# Patient Record
Sex: FEMALE | Race: WHITE | NOT HISPANIC OR LATINO | Employment: FULL TIME | URBAN - METROPOLITAN AREA
[De-identification: names, ages, dates, MRNs, and addresses within clinical notes are randomized per-mention and may not be internally consistent; named-entity substitution may affect disease eponyms.]

---

## 2023-09-11 PROBLEM — D22.4 MELANOCYTIC NEVI OF SCALP AND NECK: Status: ACTIVE | Noted: 2023-07-07

## 2023-09-11 PROBLEM — Z85.828 PERSONAL HISTORY OF OTHER MALIGNANT NEOPLASM OF SKIN: Status: ACTIVE | Noted: 2023-07-07

## 2023-09-11 PROBLEM — K43.9 EPIGASTRIC HERNIA: Status: ACTIVE | Noted: 2023-09-11

## 2023-09-11 PROBLEM — D22.60 MELANOCYTIC NEVI OF UNSPECIFIED UPPER LIMB, INCLUDING SHOULDER: Status: ACTIVE | Noted: 2023-07-07

## 2023-09-11 PROBLEM — L30.1 DYSHIDROSIS (POMPHOLYX): Status: ACTIVE | Noted: 2023-07-07

## 2023-09-11 PROBLEM — L81.4 LENTIGINES: Status: ACTIVE | Noted: 2023-07-07

## 2023-09-11 PROBLEM — D18.01 HEMANGIOMA OF SKIN AND SUBCUTANEOUS TISSUE: Status: ACTIVE | Noted: 2023-07-07

## 2023-09-11 PROBLEM — D36.7 BENIGN NEOPLASM OF TRUNK: Status: ACTIVE | Noted: 2023-07-07

## 2023-09-11 PROBLEM — D48.5 NEOPLASM OF UNCERTAIN BEHAVIOR OF SKIN: Status: ACTIVE | Noted: 2023-07-07

## 2023-09-11 PROBLEM — L57.9 SKIN CHANGES DUE TO CHRONIC EXPOSURE TO NONIONIZING RADIATION, UNSPECIFIED: Status: ACTIVE | Noted: 2023-07-07

## 2023-09-11 PROBLEM — D22.30 MELANOCYTIC NEVI OF UNSPECIFIED PART OF FACE: Status: ACTIVE | Noted: 2023-07-07

## 2023-09-11 PROBLEM — L60.1 ONYCHOLYSIS: Status: ACTIVE | Noted: 2023-07-07

## 2023-09-11 PROBLEM — D22.20 MELANOCYTIC NEVI OF EAR AND EXTERNAL AURICULAR CANAL: Status: ACTIVE | Noted: 2023-07-07

## 2023-09-11 PROBLEM — D36.7 BENIGN NEOPLASM OF UPPER EXTREMITY: Status: ACTIVE | Noted: 2023-07-07

## 2023-09-11 PROBLEM — B00.9 HERPES: Status: ACTIVE | Noted: 2023-09-11

## 2023-09-11 PROBLEM — H16.149 SPK (SUPERFICIAL PUNCTATE KERATITIS): Status: ACTIVE | Noted: 2023-09-11

## 2023-09-11 PROBLEM — H10.89 GPC (GIANT PAPILLARY CONJUNCTIVITIS): Status: ACTIVE | Noted: 2023-09-11

## 2023-09-11 PROBLEM — D22.5 MELANOCYTIC NEVI OF TRUNK: Status: ACTIVE | Noted: 2023-07-07

## 2023-09-11 PROBLEM — E55.9 VITAMIN D DEFICIENCY: Status: ACTIVE | Noted: 2023-09-11

## 2023-09-11 PROBLEM — F19.20 STEROID DEPENDENCE (MULTI): Status: ACTIVE | Noted: 2023-09-11

## 2023-09-11 PROBLEM — D22.71 MELANOCYTIC NEVI OF RIGHT LOWER LIMB, INCLUDING HIP: Status: ACTIVE | Noted: 2023-07-07

## 2023-09-11 PROBLEM — M81.0 OSTEOPOROSIS: Status: ACTIVE | Noted: 2023-09-11

## 2023-09-11 PROBLEM — K42.9 UMBILICAL HERNIA WITHOUT OBSTRUCTION AND WITHOUT GANGRENE: Status: ACTIVE | Noted: 2023-09-11

## 2023-09-11 PROBLEM — L81.4 OTHER MELANIN HYPERPIGMENTATION: Status: ACTIVE | Noted: 2023-07-07

## 2023-09-11 PROBLEM — H52.4 HYPEROPIA WITH PRESBYOPIA: Status: ACTIVE | Noted: 2023-09-11

## 2023-09-11 PROBLEM — D49.2 NEOPLASM OF UNSPECIFIED BEHAVIOR OF BONE, SOFT TISSUE, AND SKIN: Status: ACTIVE | Noted: 2023-07-07

## 2023-09-11 PROBLEM — L82.1 OTHER SEBORRHEIC KERATOSIS: Status: ACTIVE | Noted: 2023-07-07

## 2023-09-11 PROBLEM — S05.91XA OCULAR TRAUMA OF RIGHT EYE: Status: ACTIVE | Noted: 2023-09-11

## 2023-09-11 PROBLEM — D36.7 BENIGN NEOPLASM OF LOWER EXTREMITY: Status: ACTIVE | Noted: 2023-07-07

## 2023-09-11 PROBLEM — K51.90 ULCERATIVE COLITIS (MULTI): Status: ACTIVE | Noted: 2023-09-11

## 2023-09-11 PROBLEM — L90.5 SCAR CONDITION AND FIBROSIS OF SKIN: Status: ACTIVE | Noted: 2023-07-07

## 2023-09-11 PROBLEM — R92.30 DENSE BREAST TISSUE: Status: ACTIVE | Noted: 2023-09-11

## 2023-09-11 PROBLEM — H52.00 HYPEROPIA WITH PRESBYOPIA: Status: ACTIVE | Noted: 2023-09-11

## 2023-09-11 PROBLEM — L57.0 ACTINIC KERATOSIS: Status: ACTIVE | Noted: 2023-07-07

## 2023-09-11 PROBLEM — R05.3 CHRONIC COUGH: Status: ACTIVE | Noted: 2023-09-11

## 2023-09-11 RX ORDER — FLUTICASONE PROPIONATE 0.5 MG/ML
LOTION TOPICAL
COMMUNITY
Start: 2020-05-05 | End: 2023-10-13

## 2023-09-11 RX ORDER — RISEDRONATE SODIUM 35 MG/1
35 TABLET, FILM COATED ORAL
COMMUNITY
End: 2024-01-02

## 2023-09-11 RX ORDER — ESCITALOPRAM OXALATE 10 MG/1
1 TABLET ORAL DAILY
COMMUNITY
Start: 2021-07-08

## 2023-09-11 RX ORDER — PREDNISONE 1 MG/1
3 TABLET ORAL DAILY
COMMUNITY
Start: 2021-08-26 | End: 2023-12-05 | Stop reason: SDUPTHER

## 2023-09-11 RX ORDER — TRETINOIN 1 MG/G
CREAM TOPICAL
COMMUNITY
Start: 2020-09-15 | End: 2023-10-13

## 2023-09-11 RX ORDER — EFINACONAZOLE 100 MG/ML
SOLUTION TOPICAL
COMMUNITY
Start: 2020-11-18 | End: 2023-10-13

## 2023-09-11 RX ORDER — ERGOCALCIFEROL 1.25 MG/1
1.25 CAPSULE ORAL
COMMUNITY
End: 2023-12-12 | Stop reason: SDUPTHER

## 2023-09-11 RX ORDER — FOLIC ACID 0.8 MG
1 TABLET ORAL DAILY
COMMUNITY

## 2023-09-11 RX ORDER — SULFASALAZINE 500 MG/1
6 TABLET ORAL DAILY
COMMUNITY
Start: 2013-12-24 | End: 2024-04-03 | Stop reason: WASHOUT

## 2023-09-11 RX ORDER — TRETINOIN 0.25 MG/G
CREAM TOPICAL
COMMUNITY
Start: 2021-03-23 | End: 2023-10-13

## 2023-09-11 RX ORDER — ASPIRIN 325 MG
1 TABLET, DELAYED RELEASE (ENTERIC COATED) ORAL
COMMUNITY
End: 2023-10-13

## 2023-09-11 RX ORDER — TOFACITINIB 22 MG/1
1 TABLET, FILM COATED, EXTENDED RELEASE ORAL DAILY
COMMUNITY
Start: 2022-03-17 | End: 2023-10-19 | Stop reason: DRUGHIGH

## 2023-09-11 RX ORDER — PREDNISONE 1 MG/1
2 TABLET ORAL DAILY
COMMUNITY
End: 2024-03-28 | Stop reason: SDUPTHER

## 2023-10-13 ENCOUNTER — OFFICE VISIT (OUTPATIENT)
Dept: OBSTETRICS AND GYNECOLOGY | Facility: CLINIC | Age: 53
End: 2023-10-13
Payer: COMMERCIAL

## 2023-10-13 VITALS
HEART RATE: 86 BPM | WEIGHT: 133 LBS | SYSTOLIC BLOOD PRESSURE: 106 MMHG | DIASTOLIC BLOOD PRESSURE: 69 MMHG | HEIGHT: 68 IN | BODY MASS INDEX: 20.16 KG/M2

## 2023-10-13 DIAGNOSIS — Z12.31 ENCOUNTER FOR SCREENING MAMMOGRAM FOR MALIGNANT NEOPLASM OF BREAST: ICD-10-CM

## 2023-10-13 DIAGNOSIS — N95.1 VAGINAL DRYNESS, MENOPAUSAL: Primary | ICD-10-CM

## 2023-10-13 DIAGNOSIS — Z01.419 ENCOUNTER FOR GYNECOLOGICAL EXAMINATION WITHOUT ABNORMAL FINDING: ICD-10-CM

## 2023-10-13 PROCEDURE — 1036F TOBACCO NON-USER: CPT | Performed by: OBSTETRICS & GYNECOLOGY

## 2023-10-13 PROCEDURE — 99396 PREV VISIT EST AGE 40-64: CPT | Performed by: OBSTETRICS & GYNECOLOGY

## 2023-10-13 RX ORDER — ESTRADIOL 0.1 MG/G
0.5 CREAM VAGINAL NIGHTLY
Qty: 42.5 G | Refills: 2 | Status: SHIPPED | OUTPATIENT
Start: 2023-10-13 | End: 2023-12-05

## 2023-10-13 NOTE — PROGRESS NOTES
Subjective   Breanne Orta is a 53 y.o. female here for a routine exam. Current complaints: She has colitis and is current on her colonoscopy.  We discussed her twins are sophomore is in college.  S  he has begun to notice painful intercourse and vaginal dryness. Personal health questionnaire reviewed: yes.     Gynecologic History  Patient's last menstrual period was 05/01/2022.  Contraception: post menopausal status  Last Pap: 7/13/21. Results were: normal  Last mammogram: 8/25/23. Results were: normal    Obstetric History  OB History   No obstetric history on file.       Objective   Constitutional: Alert and in no acute distress. Well developed, well nourished.   Head and Face: Head and face: Normal.    Eyes: Normal external exam - nonicteric sclera, extraocular movements intact (EOMI) and no ptosis.   Neck: No neck asymmetry. Supple. Thyroid not enlarged and there were no palpable thyroid nodules.    Pulmonary: No respiratory distress.   Chest: Breasts: Normal appearance, no nipple discharge and no skin changes. Palpation of breasts and axillae: No palpable mass and no axillary lymphadenopathy.   Abdomen: Soft nontender; no abdominal mass palpated. No organomegaly. No hernias.   Genitourinary: External genitalia: Normal. No inguinal lymphadenopathy. Bartholin's Urethral and Skenes Glands: Normal. Urethra: Normal.  Bladder: Normal on palpation. Vagina: Normal. Cervix: Normal. No pap sent. Uterus: Normal.  Right Adnexa/parametria: Normal.  Left Adnexa/parametria: Normal.  Inspection of Perianal Area: Normal.   Musculoskeletal: No joint swelling seen, normal movements of all extremities.   Skin: Normal skin color and pigmentation, normal skin turgor, and no rash.   Neurologic: Non-focal. Grossly intact.   Psychiatric: Alert and oriented x 3. Affect normal to patient baseline. Mood: Appropriate.  Physical Exam     Assessment/Plan   Healthy female exam.  This is a 53-year-old female with a normal exam.  No Pap smear  was sent, she is high risk HPV negative.  Her routine mammogram was completed in August 2023 and ordered for 2024.  We discussed the vaginal dryness and pain with intercourse consistent with menopausal changes.  I recommend beginning estradiol cream.  She will use a pea-sized amount at the vaginal opening every night for 2 weeks, then 3 times a week thereafter.  It could take 8 to 12 weeks or longer for the full effect.  I will see her routinely in 1 year.  Mammogram ordered.

## 2023-10-19 DIAGNOSIS — K51.80 OTHER ULCERATIVE COLITIS WITHOUT COMPLICATION (MULTI): Primary | ICD-10-CM

## 2023-10-19 RX ORDER — TOFACITINIB 22 MG/1
22 TABLET, FILM COATED, EXTENDED RELEASE ORAL DAILY
Qty: 90 TABLET | Refills: 2 | Status: SHIPPED | OUTPATIENT
Start: 2023-10-19 | End: 2023-11-01 | Stop reason: SDUPTHER

## 2023-10-31 ENCOUNTER — OFFICE VISIT (OUTPATIENT)
Dept: OPHTHALMOLOGY | Facility: CLINIC | Age: 53
End: 2023-10-31
Payer: COMMERCIAL

## 2023-10-31 DIAGNOSIS — S05.91XD RIGHT EYE INJURY, SUBSEQUENT ENCOUNTER: Primary | ICD-10-CM

## 2023-10-31 PROCEDURE — 99212 OFFICE O/P EST SF 10 MIN: CPT | Performed by: OPTOMETRIST

## 2023-10-31 ASSESSMENT — TONOMETRY
IOP_METHOD: GOLDMANN APPLANATION
OS_IOP_MMHG: CL'S
OD_IOP_MMHG: CL'S

## 2023-10-31 ASSESSMENT — REFRACTION_CURRENTRX
OS_SPHERE: +5.50
OD_BRAND: DAILIES AQUACOMFORT PLUS
OD_DIAMETER: 14.0
OS_DIAMETER: 14.0
OS_BASECURVE: 8.7
OD_SPHERE: +5.50
OS_BRAND: DAILIES AQUACOMFORT PLUS
OD_BASECURVE: 8.7

## 2023-10-31 ASSESSMENT — CONF VISUAL FIELD
OS_SUPERIOR_TEMPORAL_RESTRICTION: 0
OS_NORMAL: 1
OS_INFERIOR_NASAL_RESTRICTION: 0
OS_SUPERIOR_NASAL_RESTRICTION: 0
OD_SUPERIOR_NASAL_RESTRICTION: 0
OD_INFERIOR_TEMPORAL_RESTRICTION: 0
OD_INFERIOR_NASAL_RESTRICTION: 0
OD_SUPERIOR_TEMPORAL_RESTRICTION: 0
OD_NORMAL: 1
OS_INFERIOR_TEMPORAL_RESTRICTION: 0

## 2023-10-31 ASSESSMENT — SLIT LAMP EXAM - LIDS
COMMENTS: NORMAL
COMMENTS: NORMAL

## 2023-10-31 ASSESSMENT — VISUAL ACUITY
VA_OR_OD_CURRENT_RX: 20/20
VA_OR_OS_CURRENT_RX: 20/20
CORRECTION_TYPE: CONTACTS
METHOD: SNELLEN - LINEAR
OS_CC: 20/20
OD_CC: 20/20

## 2023-10-31 ASSESSMENT — ENCOUNTER SYMPTOMS: EYES NEGATIVE: 1

## 2023-10-31 NOTE — PROGRESS NOTES
Assessment/Plan   Diagnoses and all orders for this visit:  Right eye injury, subsequent encounter    Patient has fully recovered from ocular injury and refractive state is returned to habitual    Wearing habitual contact lens (CL) power again    Resume annual exams, follow up 6 months full exam

## 2023-11-01 DIAGNOSIS — K51.80 OTHER ULCERATIVE COLITIS WITHOUT COMPLICATION (MULTI): ICD-10-CM

## 2023-11-01 RX ORDER — TOFACITINIB 22 MG/1
22 TABLET, FILM COATED, EXTENDED RELEASE ORAL DAILY
Qty: 90 TABLET | Refills: 2 | Status: SHIPPED | OUTPATIENT
Start: 2023-11-01 | End: 2023-11-02 | Stop reason: SDUPTHER

## 2023-11-02 DIAGNOSIS — K51.80 OTHER ULCERATIVE COLITIS WITHOUT COMPLICATION (MULTI): ICD-10-CM

## 2023-11-02 RX ORDER — TOFACITINIB 22 MG/1
TABLET, FILM COATED, EXTENDED RELEASE ORAL
Qty: 30 TABLET | Refills: 5 | Status: SHIPPED | OUTPATIENT
Start: 2023-11-02 | End: 2024-02-01 | Stop reason: SDUPTHER

## 2023-12-01 ENCOUNTER — TELEPHONE (OUTPATIENT)
Dept: DERMATOLOGY | Facility: CLINIC | Age: 53
End: 2023-12-01

## 2023-12-01 NOTE — TELEPHONE ENCOUNTER
Pt left message requesting an appointment for Botox with Dr. Augustine.  This nurse will forward request to PAR team to schedule.

## 2023-12-04 ASSESSMENT — DERMATOLOGY QUALITY OF LIFE (QOL) ASSESSMENT
RATE HOW BOTHERED YOU ARE BY EFFECTS OF YOUR SKIN PROBLEMS ON YOUR ACTIVITIES (EG, GOING OUT, ACCOMPLISHING WHAT YOU WANT, WORK ACTIVITIES OR YOUR RELATIONSHIPS WITH OTHERS): 0 - NEVER BOTHERED
RATE HOW EMOTIONALLY BOTHERED YOU ARE BY YOUR SKIN PROBLEM (FOR EXAMPLE, WORRY, EMBARRASSMENT, FRUSTRATION): 0 - NEVER BOTHERED
WHAT SINGLE SKIN CONDITION LISTED BELOW IS THE PATIENT ANSWERING THE QUALITY-OF-LIFE ASSESSMENT QUESTIONS ABOUT: NONE OF THE ABOVE
RATE HOW BOTHERED YOU ARE BY EFFECTS OF YOUR SKIN PROBLEMS ON YOUR ACTIVITIES (EG, GOING OUT, ACCOMPLISHING WHAT YOU WANT, WORK ACTIVITIES OR YOUR RELATIONSHIPS WITH OTHERS): 0 - NEVER BOTHERED
RATE HOW EMOTIONALLY BOTHERED YOU ARE BY YOUR SKIN PROBLEM (FOR EXAMPLE, WORRY, EMBARRASSMENT, FRUSTRATION): 0 - NEVER BOTHERED
WHAT SINGLE SKIN CONDITION LISTED BELOW IS THE PATIENT ANSWERING THE QUALITY-OF-LIFE ASSESSMENT QUESTIONS ABOUT: NONE OF THE ABOVE
RATE HOW BOTHERED YOU ARE BY SYMPTOMS OF YOUR SKIN PROBLEM (EG, ITCHING, STINGING BURNING, HURTING OR SKIN IRRITATION): 0 - NEVER BOTHERED
RATE HOW BOTHERED YOU ARE BY SYMPTOMS OF YOUR SKIN PROBLEM (EG, ITCHING, STINGING BURNING, HURTING OR SKIN IRRITATION): 0 - NEVER BOTHERED

## 2023-12-04 ASSESSMENT — PATIENT GLOBAL ASSESSMENT (PGA): WHAT IS THE PGA: PATIENT GLOBAL ASSESSMENT:  1 - CLEAR

## 2023-12-05 ENCOUNTER — OFFICE VISIT (OUTPATIENT)
Dept: DERMATOLOGY | Facility: CLINIC | Age: 53
End: 2023-12-05
Payer: COMMERCIAL

## 2023-12-05 DIAGNOSIS — L57.0 ACTINIC KERATOSIS: Primary | ICD-10-CM

## 2023-12-05 DIAGNOSIS — L81.4 LENTIGO: ICD-10-CM

## 2023-12-05 DIAGNOSIS — L90.5 SCAR CONDITIONS AND FIBROSIS OF SKIN: ICD-10-CM

## 2023-12-05 DIAGNOSIS — D18.01 HEMANGIOMA OF SKIN: ICD-10-CM

## 2023-12-05 DIAGNOSIS — L03.011 PARONYCHIA OF FINGER OF RIGHT HAND: ICD-10-CM

## 2023-12-05 DIAGNOSIS — Z85.828 PERSONAL HISTORY OF OTHER MALIGNANT NEOPLASM OF SKIN: ICD-10-CM

## 2023-12-05 DIAGNOSIS — D23.9 OTHER BENIGN NEOPLASM OF SKIN, UNSPECIFIED: ICD-10-CM

## 2023-12-05 DIAGNOSIS — Z12.83 ENCOUNTER FOR SCREENING FOR MALIGNANT NEOPLASM OF SKIN: ICD-10-CM

## 2023-12-05 PROCEDURE — 17000 DESTRUCT PREMALG LESION: CPT | Performed by: STUDENT IN AN ORGANIZED HEALTH CARE EDUCATION/TRAINING PROGRAM

## 2023-12-05 PROCEDURE — 1036F TOBACCO NON-USER: CPT | Performed by: DERMATOLOGY

## 2023-12-05 PROCEDURE — 17003 DESTRUCT PREMALG LES 2-14: CPT | Performed by: STUDENT IN AN ORGANIZED HEALTH CARE EDUCATION/TRAINING PROGRAM

## 2023-12-05 PROCEDURE — 99214 OFFICE O/P EST MOD 30 MIN: CPT | Performed by: DERMATOLOGY

## 2023-12-05 RX ORDER — CLOBETASOL PROPIONATE 0.5 MG/G
OINTMENT TOPICAL 2 TIMES DAILY
Qty: 60 G | Refills: 0 | Status: SHIPPED | OUTPATIENT
Start: 2023-12-05 | End: 2023-12-19

## 2023-12-05 NOTE — PROGRESS NOTES
Subjective     Breanne Orta is a 53 y.o. female who presents for the following: Skin Check.     Patient reports a flaky spot on her scalp that is near her Mohs surgery site and appeared about 2 months ago.    Review of Systems:  No other skin or systemic complaints other than what is documented elsewhere in the note.    The following portions of the chart were reviewed this encounter and updated as appropriate:  Meds         Skin Cancer History  - 2008- Hx of BCC left temple s/p Mohs  - 2005- Hx of moderately dysplastic nevus right lower back s/p excision     Family History:  Melanoma: Mother had MIS. and mom's brother (uncle)  of melanoma at age 35   Specialty Problems          Dermatology Problems    Actinic keratosis    Dyshidrosis (pompholyx)    Hemangioma of skin and subcutaneous tissue    Lentigines    Melanocytic nevi of ear and external auricular canal    Melanocytic nevi of right lower limb, including hip    Melanocytic nevi of scalp and neck    Melanocytic nevi of trunk    Melanocytic nevi of unspecified part of face    Melanocytic nevi of unspecified upper limb, including shoulder    Neoplasm of uncertain behavior of skin    Onycholysis    Other melanin hyperpigmentation    Other seborrheic keratosis    Personal history of other malignant neoplasm of skin    Scar condition and fibrosis of skin    Skin changes due to chronic exposure to nonionizing radiation, unspecified        Objective   Well appearing patient in no apparent distress; mood and affect are within normal limits.    A full examination was performed including scalp, head, eyes, ears, nose, lips, neck, chest, axillae, abdomen, back, buttocks, bilateral upper extremities, bilateral lower extremities, hands, feet, fingers, toes, fingernails, and toenails. All findings within normal limits unless otherwise noted below.    Assessment/Plan   1. Actinic keratosis (2)  Left Temple, Mid Forehead  Erythematous macules with gritty  scale.    Destr of lesion - Left Temple, Mid Forehead  Complexity: simple    Destruction method: cryotherapy    Informed consent: discussed and consent obtained    Lesion destroyed using liquid nitrogen: Yes    Cryotherapy cycles:  1  Outcome: patient tolerated procedure well with no complications    Post-procedure details: wound care instructions given      2. Other benign neoplasm of skin, unspecified  left medial upper back  3x3 mm darkly pigmented macule with possible pseudohorn cysts under dermoscopy                  - Favor benign seborrheic keratosis but possible melanocytic lesion  - Measured and photographed today, monitor at next visit  - Advised patient to monitor monthly and return for sooner appointment for biopsy if changing    3. Paronychia of finger of right hand  Right Distal Thumb  Shortened nail with mild erythema of proximal and lateral nail folds    - Patient reports stunted nail growth  - Favor paronychial dermatitis   - Recommend clobetasol 0.05% ointment daily and tretinoin cream in evening    Related Medications  clobetasol (Temovate) 0.05 % ointment  Apply topically 2 times a day for 14 days.    4. Scar conditions and fibrosis of skin (2)  Chest - Medial (Center), Left temple  Well-healed scar of left temple, slightly hypertrophic scar of mid chest    - s/p Mohs surgery for BCC of left temple in 2008  - Scar without evidence of recurrence  - Slightly hypertrophic scar of mid chest, previously treated with ILK but has improved since treatment    5. Hemangioma of skin  Scattered cherry-red papule(s).    Benign, reassurance provided    6. Lentigo  Scattered tan macules in sun-exposed areas.    Benign, reassurance provided    7. Encounter for screening for malignant neoplasm of skin    Related Procedures  Follow Up In Dermatology - Established Patient    8. Personal history of other malignant neoplasm of skin      RTC FBSE 6 months or sooner if left back lesion is changing.    Bushra Petersen,  MD PATEL personally reviewed the history, examined the patient, interpreted findings and tests, and provided the medical-decision making, and was present for the key portions of the visit and procedures.

## 2023-12-05 NOTE — PATIENT INSTRUCTIONS
It was great to see you in Dr. Rosado's clinic today. We treated a couple pre-cancers, called actinic keratoses, on the forehead with liquid nitrogen today. There is also a dark spot on your left back that we think may be benign but measured and photographed today to monitor at your next visit. It is currently only 3x3 mm. You can look at this spot once a month and if it is getting bigger, please call for a sooner appointment. Otherwise, your skin looks great and we will see you for another skin check in 6 months.

## 2023-12-06 DIAGNOSIS — E55.9 VITAMIN D DEFICIENCY: Primary | ICD-10-CM

## 2023-12-12 RX ORDER — ERGOCALCIFEROL 1.25 MG/1
1 CAPSULE ORAL
Qty: 2 CAPSULE | Refills: 11 | Status: SHIPPED | OUTPATIENT
Start: 2023-12-12

## 2023-12-20 NOTE — PROGRESS NOTES
"REASON FOR VISIT:  ,ulcerative colitis    HPI:  Breanne Orta is a 53 y.o. female who presents for follow-up of UC.  Ddiagnosed as proctitis in 2001. Mostly mild disease over the years, but increasingly active since 2014.      Started flaring in May 2017 with progressively worse disease. Tried and failed Humira, Remicade, and Entyvio and has been steroid dependent (2-5 mg) since fall 2017. Colectomy discussed and has met with several surgeons.  Started on XELJANZ 10/2019 and has generally improved with ability to taper prednisone to 3 mg daily, but has not been able to stop prednisone. 2 mg daily     3/2022 stable but chronic low grade urgency and stools 4-5 daily with occasional small volume incontinence on Xeljanz 22 mg daily and prednisone 2 mg daily. Plan was to try [place].     Saw Dr. Thompson 6/2022 and no adrenal insufficiency. Bone density stable on residronate and vitamin D.     9/2022 felt great with 2-3 stools in morning and rest of the day 1-2. Still some urgency. No blood. Rare nocturnal stools. Stool not loose. Feels well. No pain. If not for urgency, everything would \"be perfect\". She did try mesalamine for a while, didn't think it helped at all. Continued Xeljanz 22 mg once daily and prednisone 2 mg daily.     1/2023 colonoscopy with Medina 3 disease in rectum, medina 2 disease in sigmoid, and medina 0 disease in rest of colon.  Pathology pancolitis without dysplasia.    (+) COVID 9/2023    In follow-up today, feels great.  Prednisone at 2 mg daily, sulfasalazine 3 daily in AM, and Xeljanz 22 mg daily.  Bowels are are twice in AM and rest of the day OK.  Still with urgency.  On occasion will still have incontinence, usually happens when walking after she eats.  Never uses loperamide before goes for a walk.  Overall QoL is good.      (+) chronic LBP; disc disease and spinal narrowing  REVIEW OF SYSTEMS  Complete review of systems otherwise negative per complaint    No Known Allergies    Past Medical " History:   Diagnosis Date    Other conjunctivitis 07/13/2017    Giant papillary conjunctivitis       Past Surgical History:   Procedure Laterality Date    OTHER SURGICAL HISTORY  10/01/2019    Oophorectomy       Current Outpatient Medications   Medication Sig Dispense Refill    clobetasol (Temovate) 0.05 % ointment Apply topically 2 times a day for 14 days. 60 g 0    ergocalciferol (Vitamin D-2) 1.25 MG (50995 UT) capsule TAKE 1 CAPSULE BY MOUTH EVERY OTHER WEEK 2 capsule 11    escitalopram (Lexapro) 10 mg tablet Take 1 tablet (10 mg) by mouth once daily.      folic acid (Folvite) 800 mcg tablet Take 1 tablet (800 mcg) by mouth once daily.      predniSONE (Deltasone) 1 mg tablet Take 2 tablets (2 mg) by mouth once daily.      risedronate (Actonel) 35 mg tablet Take 1 tablet (35 mg) by mouth 1 (one) time per week.      sulfaSALAzine (Azulfidine) 500 mg tablet Take 6 tablets (3,000 mg) by mouth once daily.      tofacitinib (Xeljanz XR) 22 mg tablet extended release 24 hr Take one tablet daily 30 tablet 5     No current facility-administered medications for this visit.       PHYSICAL EXAM:  /66   Pulse 76   Temp 36.2 °C (97.2 °F)   Wt 59.4 kg (131 lb)   LMP 05/01/2022   BMI 19.92 kg/m²   LMP 05/01/2022   Vital signs reviewed  Patient alert and oriented in no acute distress  Anicteric  No cervical adenopathy  Cardiac exam regular rate and rhythm S1-S2 without murmurs gallops or rubs  Lungs clear to auscultation bilaterally  Abdomen soft and nontender without organomegaly or mass.  No rebound or guarding.  Bowel sounds present  Extremities without edema    Lab Results   Component Value Date    WBC 5.8 09/22/2022    HGB 13.3 09/22/2022    HCT 39.6 09/22/2022     (H) 09/22/2022     09/22/2022     Lab Results   Component Value Date    ALT 22 09/22/2022    AST 33 09/22/2022    ALKPHOS 58 09/22/2022    BILITOT 0.5 09/22/2022             ASSESSMENT  #UC-overall, she is quite happy with where she is at  "as regards her ulcerative colitis.  She still has some urgency, but we know that she has some ongoing rectal disease and I also think she has some chronic stiffening/fibrosis in the rectum that likely is not reversible.    We discussed possible change in therapy, recognizing that she still has rectal inflammation on endoscopy, but she does not want to \"rock the boat\".  Although things are not perfect, she is quite happy with her overall quality of life.  She will again try to lower prednisone.    Due for lab monitoring.  Will also check a fecal calprotectin to get a sense of how active her disease remains.    # Chronic Cough-she may have some GERD.  We will give her a trial of pantoprazole and see if things improve.    PLAN  1) continue Xeljanz 1 tablet daily and prednisone 2 mg daily  2) again consider trying to lower prednisone to 2 mg 1 day alternating with 1 mg daily and gradually lowering from there as possible  3) it is okay to stop sulfasalazine; if symptoms worsen after stopping sulfasalazine, then resume it and let me know  4) hold Xeljanz 3 days prior to eye surgery and for 5 to 7 days after eye surgery until you are sure that everything went well with the planned procedure  5) to see if your cough might be related to gastroesophageal reflux, begin pantoprazole (Protonix) 40 mg once daily in the morning on an empty stomach.  If your cough improves then continue this, however, if the cough does not improve you should stop it after 1 to 2 months  6) try taking 1 or 2 loperamide (Imodium) before going for a walk to see if this will improve your rare episodes of urgency and incontinence  7) your next colonoscopy will be due in January 2024  8) follow-up in the office in October 2024 or sooner if needed  9) check labs     "

## 2023-12-21 ENCOUNTER — OFFICE VISIT (OUTPATIENT)
Dept: GASTROENTEROLOGY | Facility: CLINIC | Age: 53
End: 2023-12-21
Payer: COMMERCIAL

## 2023-12-21 ENCOUNTER — LAB (OUTPATIENT)
Dept: LAB | Facility: LAB | Age: 53
End: 2023-12-21
Payer: COMMERCIAL

## 2023-12-21 VITALS
SYSTOLIC BLOOD PRESSURE: 104 MMHG | BODY MASS INDEX: 19.92 KG/M2 | WEIGHT: 131 LBS | HEART RATE: 76 BPM | DIASTOLIC BLOOD PRESSURE: 66 MMHG | TEMPERATURE: 97.2 F

## 2023-12-21 DIAGNOSIS — K51.818 OTHER ULCERATIVE COLITIS WITH OTHER COMPLICATION (MULTI): ICD-10-CM

## 2023-12-21 DIAGNOSIS — K51.818 OTHER ULCERATIVE COLITIS WITH OTHER COMPLICATION (MULTI): Primary | ICD-10-CM

## 2023-12-21 DIAGNOSIS — R10.30 LOWER ABDOMINAL PAIN: ICD-10-CM

## 2023-12-21 DIAGNOSIS — K21.9 GASTROESOPHAGEAL REFLUX DISEASE, UNSPECIFIED WHETHER ESOPHAGITIS PRESENT: ICD-10-CM

## 2023-12-21 DIAGNOSIS — R05.3 CHRONIC COUGH: ICD-10-CM

## 2023-12-21 LAB
ALBUMIN SERPL BCP-MCNC: 4.2 G/DL (ref 3.4–5)
ALP SERPL-CCNC: 71 U/L (ref 33–110)
ALT SERPL W P-5'-P-CCNC: 25 U/L (ref 7–45)
AST SERPL W P-5'-P-CCNC: 37 U/L (ref 9–39)
BASOPHILS # BLD AUTO: 0.06 X10*3/UL (ref 0–0.1)
BASOPHILS NFR BLD AUTO: 1 %
BILIRUB DIRECT SERPL-MCNC: 0.1 MG/DL (ref 0–0.3)
BILIRUB SERPL-MCNC: 0.4 MG/DL (ref 0–1.2)
EOSINOPHIL # BLD AUTO: 0.05 X10*3/UL (ref 0–0.7)
EOSINOPHIL NFR BLD AUTO: 0.8 %
ERYTHROCYTE [DISTWIDTH] IN BLOOD BY AUTOMATED COUNT: 12.5 % (ref 11.5–14.5)
HCT VFR BLD AUTO: 39.7 % (ref 36–46)
HGB BLD-MCNC: 13.1 G/DL (ref 12–16)
IMM GRANULOCYTES # BLD AUTO: 0.02 X10*3/UL (ref 0–0.7)
IMM GRANULOCYTES NFR BLD AUTO: 0.3 % (ref 0–0.9)
LYMPHOCYTES # BLD AUTO: 1 X10*3/UL (ref 1.2–4.8)
LYMPHOCYTES NFR BLD AUTO: 16.6 %
MCH RBC QN AUTO: 33.6 PG (ref 26–34)
MCHC RBC AUTO-ENTMCNC: 33 G/DL (ref 32–36)
MCV RBC AUTO: 102 FL (ref 80–100)
MONOCYTES # BLD AUTO: 1.07 X10*3/UL (ref 0.1–1)
MONOCYTES NFR BLD AUTO: 17.8 %
NEUTROPHILS # BLD AUTO: 3.81 X10*3/UL (ref 1.2–7.7)
NEUTROPHILS NFR BLD AUTO: 63.5 %
NRBC BLD-RTO: 0 /100 WBCS (ref 0–0)
PLATELET # BLD AUTO: 300 X10*3/UL (ref 150–450)
PROT SERPL-MCNC: 7.6 G/DL (ref 6.4–8.2)
RBC # BLD AUTO: 3.9 X10*6/UL (ref 4–5.2)
WBC # BLD AUTO: 6 X10*3/UL (ref 4.4–11.3)

## 2023-12-21 PROCEDURE — 99214 OFFICE O/P EST MOD 30 MIN: CPT | Performed by: INTERNAL MEDICINE

## 2023-12-21 PROCEDURE — 1036F TOBACCO NON-USER: CPT | Performed by: INTERNAL MEDICINE

## 2023-12-21 PROCEDURE — 80076 HEPATIC FUNCTION PANEL: CPT

## 2023-12-21 PROCEDURE — 36415 COLL VENOUS BLD VENIPUNCTURE: CPT

## 2023-12-21 PROCEDURE — 99214 OFFICE O/P EST MOD 30 MIN: CPT | Mod: 25 | Performed by: INTERNAL MEDICINE

## 2023-12-21 PROCEDURE — 85025 COMPLETE CBC W/AUTO DIFF WBC: CPT

## 2023-12-21 RX ORDER — PANTOPRAZOLE SODIUM 40 MG/1
40 TABLET, DELAYED RELEASE ORAL DAILY
Qty: 30 TABLET | Refills: 2 | Status: SHIPPED | OUTPATIENT
Start: 2023-12-21 | End: 2024-01-19

## 2023-12-21 RX ORDER — GABAPENTIN 300 MG/1
300 CAPSULE ORAL 2 TIMES DAILY
COMMUNITY
Start: 2023-11-14

## 2023-12-21 NOTE — PATIENT INSTRUCTIONS
Thanks for coming in for follow-up today.  I am glad that, overall, you are feeling well.  As we discussed today:    1) continue Xeljanz 1 tablet daily and prednisone 2 mg daily  2) again consider trying to lower prednisone to 2 mg 1 day alternating with 1 mg daily and gradually lowering from there as possible  3) it is okay to stop sulfasalazine; if symptoms worsen after stopping sulfasalazine, then resume it and let me know  4) hold Xeljanz 3 days prior to eye surgery and for 5 to 7 days after eye surgery until you are sure that everything went well with the planned procedure  5) to see if your cough might be related to gastroesophageal reflux, begin pantoprazole (Protonix) 40 mg once daily in the morning on an empty stomach.  If your cough improves then continue this, however, if the cough does not improve you should stop it after 1 to 2 months  6) try taking 1 or 2 loperamide (Imodium) before going for a walk to see if this will improve your rare episodes of urgency and incontinence  7) your next colonoscopy will be due in January 2024  8) follow-up in the office in October 2024 or sooner if needed  9) check labs

## 2023-12-31 DIAGNOSIS — K51.90 ULCERATIVE COLITIS, UNSPECIFIED, WITHOUT COMPLICATIONS (MULTI): ICD-10-CM

## 2024-01-02 RX ORDER — RISEDRONATE SODIUM 35 MG/1
TABLET, FILM COATED ORAL
Qty: 4 TABLET | Refills: 11 | Status: SHIPPED | OUTPATIENT
Start: 2024-01-02 | End: 2024-06-11

## 2024-01-19 DIAGNOSIS — K21.9 GASTROESOPHAGEAL REFLUX DISEASE, UNSPECIFIED WHETHER ESOPHAGITIS PRESENT: ICD-10-CM

## 2024-01-19 RX ORDER — PANTOPRAZOLE SODIUM 40 MG/1
40 TABLET, DELAYED RELEASE ORAL DAILY
Qty: 90 TABLET | Refills: 1 | Status: SHIPPED | OUTPATIENT
Start: 2024-01-19 | End: 2024-06-11

## 2024-02-01 DIAGNOSIS — K51.80 OTHER ULCERATIVE COLITIS WITHOUT COMPLICATION (MULTI): ICD-10-CM

## 2024-02-05 RX ORDER — TOFACITINIB 22 MG/1
TABLET, FILM COATED, EXTENDED RELEASE ORAL
Qty: 30 TABLET | Refills: 5 | Status: SHIPPED | OUTPATIENT
Start: 2024-02-05 | End: 2024-02-06 | Stop reason: SDUPTHER

## 2024-02-06 DIAGNOSIS — K51.80 OTHER ULCERATIVE COLITIS WITHOUT COMPLICATION (MULTI): ICD-10-CM

## 2024-02-06 RX ORDER — TOFACITINIB 22 MG/1
TABLET, FILM COATED, EXTENDED RELEASE ORAL
Qty: 30 TABLET | Refills: 5 | Status: SHIPPED | OUTPATIENT
Start: 2024-02-06 | End: 2024-04-04 | Stop reason: SDUPTHER

## 2024-02-16 ENCOUNTER — LAB (OUTPATIENT)
Dept: LAB | Facility: LAB | Age: 54
End: 2024-02-16
Payer: COMMERCIAL

## 2024-02-16 DIAGNOSIS — R10.30 LOWER ABDOMINAL PAIN: ICD-10-CM

## 2024-02-16 PROCEDURE — 83993 ASSAY FOR CALPROTECTIN FECAL: CPT

## 2024-02-18 LAB — CALPROTECTIN STL-MCNT: 92 UG/G

## 2024-03-05 ENCOUNTER — APPOINTMENT (OUTPATIENT)
Dept: DERMATOLOGY | Facility: CLINIC | Age: 54
End: 2024-03-05
Payer: COMMERCIAL

## 2024-03-28 DIAGNOSIS — K51.818 OTHER ULCERATIVE COLITIS WITH OTHER COMPLICATION (MULTI): Primary | ICD-10-CM

## 2024-03-28 RX ORDER — PREDNISONE 1 MG/1
2 TABLET ORAL DAILY
Qty: 60 TABLET | Refills: 3 | Status: SHIPPED | OUTPATIENT
Start: 2024-03-28

## 2024-04-02 ENCOUNTER — OFFICE VISIT (OUTPATIENT)
Dept: DERMATOLOGY | Facility: CLINIC | Age: 54
End: 2024-04-02

## 2024-04-02 DIAGNOSIS — L98.8 WRINKLES: ICD-10-CM

## 2024-04-02 DIAGNOSIS — Z41.9 SURGERY, ELECTIVE: Primary | ICD-10-CM

## 2024-04-02 PROCEDURE — PBTXA ADMINISTRATION FEE COSMETIC: Performed by: STUDENT IN AN ORGANIZED HEALTH CARE EDUCATION/TRAINING PROGRAM

## 2024-04-02 PROCEDURE — BOTOX BOTOX 1 UNIT: Performed by: DERMATOLOGY

## 2024-04-02 ASSESSMENT — DERMATOLOGY PATIENT ASSESSMENT: DO YOU HAVE ANY NEW OR CHANGING LESIONS: NO

## 2024-04-02 ASSESSMENT — DERMATOLOGY QUALITY OF LIFE (QOL) ASSESSMENT
RATE HOW BOTHERED YOU ARE BY SYMPTOMS OF YOUR SKIN PROBLEM (EG, ITCHING, STINGING BURNING, HURTING OR SKIN IRRITATION): 0 - NEVER BOTHERED
DATE THE QUALITY-OF-LIFE ASSESSMENT WAS COMPLETED: 66932
ARE THERE EXCLUSIONS OR EXCEPTIONS FOR THE QUALITY OF LIFE ASSESSMENT: NO
RATE HOW BOTHERED YOU ARE BY EFFECTS OF YOUR SKIN PROBLEMS ON YOUR ACTIVITIES (EG, GOING OUT, ACCOMPLISHING WHAT YOU WANT, WORK ACTIVITIES OR YOUR RELATIONSHIPS WITH OTHERS): 0 - NEVER BOTHERED
RATE HOW EMOTIONALLY BOTHERED YOU ARE BY YOUR SKIN PROBLEM (FOR EXAMPLE, WORRY, EMBARRASSMENT, FRUSTRATION): 0 - NEVER BOTHERED

## 2024-04-02 ASSESSMENT — ITCH NUMERIC RATING SCALE: HOW SEVERE IS YOUR ITCHING?: 0

## 2024-04-02 ASSESSMENT — PATIENT GLOBAL ASSESSMENT (PGA): PATIENT GLOBAL ASSESSMENT: PATIENT GLOBAL ASSESSMENT:  1 - CLEAR

## 2024-04-02 NOTE — PROGRESS NOTES
Subjective     Breanne Orta is a 53 y.o. female who presents for the following: Cosmetic (BOTOX).     Review of Systems:  No other skin or systemic complaints other than what is documented elsewhere in the note.    The following portions of the chart were reviewed this encounter and updated as appropriate:          Skin Cancer History  No skin cancer on file.    Specialty Problems          Dermatology Problems    Actinic keratosis    Dyshidrosis (pompholyx)    Hemangioma of skin and subcutaneous tissue    Lentigines    Melanocytic nevi of ear and external auricular canal    Melanocytic nevi of right lower limb, including hip    Melanocytic nevi of scalp and neck    Melanocytic nevi of trunk    Melanocytic nevi of unspecified part of face    Melanocytic nevi of unspecified upper limb, including shoulder    Neoplasm of uncertain behavior of skin    Onycholysis    Other melanin hyperpigmentation    Other seborrheic keratosis    Personal history of other malignant neoplasm of skin    Scar condition and fibrosis of skin    Skin changes due to chronic exposure to nonionizing radiation, unspecified        Objective   Well appearing patient in no apparent distress; mood and affect are within normal limits.    A focused skin examination was performed. All findings within normal limits unless otherwise noted below.    Assessment/Plan   1. Surgery, elective    Related Medications  onabotulinumtoxinA (cosmetic) (Botox) injection 50 Units      2. Wrinkles (9)  Glabella, Left Forehead (2), Left Cheondoism, Left Zygomatic Area, Right Forehead (2), Right Temple, Right Zygomatic Area  Along the glabella and lateral to the orbital rim, there are static rhytids                          Chemodenervation - Glabella, Left Forehead (2), Left Cheondoism, Left Zygomatic Area, Right Forehead (2), Right Temple, Right Zygomatic Area    Date/Time: 4/2/2024 3:53 PM    Performed by: María Elena Julien MD  Authorized by: Brenda Augustine MD      Cosmetic:   yes    Consent:      Consent obtained:  Verbal     Consent given by:  Patient     Risks discussed:  Weakness, poor cosmetic result, infection, pain and bleeding     Alternatives discussed:  No treatment    Universal protocol:      Relevant documents present and verified:  Yes       Site/side verified:  Yes       Immediately prior to procedure a time out was called:  Yes       Patient identity confirmed:  Verbally with patient    Pre-procedure details:   Prep type:  Isopropyl alcohol    Procedure details:      Diluted by:  Preservative free saline     Toxin (Brand):  OnaBoNT-A (Botox)      Post-procedure details:      Patient tolerance of procedure:  Tolerated well, no immediate complications    Related Procedures  Follow Up In Dermatology - Established Patient      Botox Injection    Breanne came today for Botox injection.  I reviewed with her the risks and benefits including ptosis, infection, and bleeding. She has no contraindications. She is on no antibiotics and has no neuromuscular disorders.  Pregnancy is not an issue.  Informed signed consent reviewed and obtained.     She tolerated the procedure well.  The patient  will return to see me again in three to four months, earlier if there are any problems.     Breanne understands the side effects and risks, as well as the necessity for continued treatment to maintain improvement.  Additional therapy may be necessary. Call if there are any problems. See diagram for injection sites and dosages. 24 units were used at a concentration of 4 units per 0.1 mL.    María Elena Julien MD    I reviewed with her that cosmetic billing for botox will likely increase by the time of her next visit, she expressed understanding.     I was present for the entirety of the procedure(s).   Cosmetic fee paid at time of visit, 35% discount x 24 units    Brenda Augustine MD

## 2024-04-04 DIAGNOSIS — K51.80 OTHER ULCERATIVE COLITIS WITHOUT COMPLICATION (MULTI): ICD-10-CM

## 2024-04-15 RX ORDER — TOFACITINIB 22 MG/1
TABLET, FILM COATED, EXTENDED RELEASE ORAL
Qty: 30 TABLET | Refills: 5 | Status: SHIPPED | OUTPATIENT
Start: 2024-04-15

## 2024-06-03 ASSESSMENT — DERMATOLOGY QUALITY OF LIFE (QOL) ASSESSMENT
WHAT SINGLE SKIN CONDITION LISTED BELOW IS THE PATIENT ANSWERING THE QUALITY-OF-LIFE ASSESSMENT QUESTIONS ABOUT: NONE OF THE ABOVE
RATE HOW BOTHERED YOU ARE BY SYMPTOMS OF YOUR SKIN PROBLEM (EG, ITCHING, STINGING BURNING, HURTING OR SKIN IRRITATION): 0 - NEVER BOTHERED
WHAT SINGLE SKIN CONDITION LISTED BELOW IS THE PATIENT ANSWERING THE QUALITY-OF-LIFE ASSESSMENT QUESTIONS ABOUT: NONE OF THE ABOVE
RATE HOW BOTHERED YOU ARE BY EFFECTS OF YOUR SKIN PROBLEMS ON YOUR ACTIVITIES (EG, GOING OUT, ACCOMPLISHING WHAT YOU WANT, WORK ACTIVITIES OR YOUR RELATIONSHIPS WITH OTHERS): 0 - NEVER BOTHERED
RATE HOW EMOTIONALLY BOTHERED YOU ARE BY YOUR SKIN PROBLEM (FOR EXAMPLE, WORRY, EMBARRASSMENT, FRUSTRATION): 1
RATE HOW BOTHERED YOU ARE BY SYMPTOMS OF YOUR SKIN PROBLEM (EG, ITCHING, STINGING BURNING, HURTING OR SKIN IRRITATION): 0 - NEVER BOTHERED
RATE HOW BOTHERED YOU ARE BY EFFECTS OF YOUR SKIN PROBLEMS ON YOUR ACTIVITIES (EG, GOING OUT, ACCOMPLISHING WHAT YOU WANT, WORK ACTIVITIES OR YOUR RELATIONSHIPS WITH OTHERS): 0 - NEVER BOTHERED
RATE HOW EMOTIONALLY BOTHERED YOU ARE BY YOUR SKIN PROBLEM (FOR EXAMPLE, WORRY, EMBARRASSMENT, FRUSTRATION): 1

## 2024-06-05 ENCOUNTER — OFFICE VISIT (OUTPATIENT)
Dept: DERMATOLOGY | Facility: CLINIC | Age: 54
End: 2024-06-05
Payer: COMMERCIAL

## 2024-06-05 DIAGNOSIS — D18.01 HEMANGIOMA OF SKIN: ICD-10-CM

## 2024-06-05 DIAGNOSIS — D22.9 MULTIPLE BENIGN NEVI: ICD-10-CM

## 2024-06-05 DIAGNOSIS — Z85.828 PERSONAL HISTORY OF SKIN CANCER: ICD-10-CM

## 2024-06-05 DIAGNOSIS — L30.9 DERMATITIS: ICD-10-CM

## 2024-06-05 DIAGNOSIS — Z12.83 ENCOUNTER FOR SCREENING FOR MALIGNANT NEOPLASM OF SKIN: Primary | ICD-10-CM

## 2024-06-05 DIAGNOSIS — D48.5 NEOPLASM OF UNCERTAIN BEHAVIOR OF SKIN: ICD-10-CM

## 2024-06-05 DIAGNOSIS — L82.1 SEBORRHEIC KERATOSIS: ICD-10-CM

## 2024-06-05 DIAGNOSIS — L57.8 ACTINIC SKIN DAMAGE: ICD-10-CM

## 2024-06-05 DIAGNOSIS — L81.4 LENTIGO: ICD-10-CM

## 2024-06-05 PROCEDURE — 99214 OFFICE O/P EST MOD 30 MIN: CPT | Performed by: DERMATOLOGY

## 2024-06-05 RX ORDER — TACROLIMUS 1 MG/G
OINTMENT TOPICAL
Qty: 30 G | Refills: 3 | Status: SHIPPED | OUTPATIENT
Start: 2024-06-05

## 2024-06-05 ASSESSMENT — DERMATOLOGY PATIENT ASSESSMENT
ARE YOU TRYING TO GET PREGNANT: NO
DO YOU USE SUNSCREEN: OCCASIONALLY
DO YOU HAVE ANY NEW OR CHANGING LESIONS: NO
DO YOU HAVE IRREGULAR MENSTRUAL CYCLES: NO
DO YOU USE A TANNING BED: YES, PREVIOUSLY
HAVE YOU HAD OR DO YOU HAVE A STAPH INFECTION: NO
HAVE YOU HAD OR DO YOU HAVE VASCULAR DISEASE: NO
ARE YOU ON BIRTH CONTROL: NO
ARE YOU AN ORGAN TRANSPLANT RECIPIENT: NO

## 2024-06-05 ASSESSMENT — PATIENT GLOBAL ASSESSMENT (PGA): PATIENT GLOBAL ASSESSMENT: PATIENT GLOBAL ASSESSMENT:  1 - CLEAR

## 2024-06-05 ASSESSMENT — DERMATOLOGY QUALITY OF LIFE (QOL) ASSESSMENT
ARE THERE EXCLUSIONS OR EXCEPTIONS FOR THE QUALITY OF LIFE ASSESSMENT: NO
DATE THE QUALITY-OF-LIFE ASSESSMENT WAS COMPLETED: 66996

## 2024-06-05 ASSESSMENT — ITCH NUMERIC RATING SCALE: HOW SEVERE IS YOUR ITCHING?: 1

## 2024-06-05 NOTE — PATIENT INSTRUCTIONS
It was great seeing you in Dr. Rosado's clinic today!    We believe that the rash on your chin may be a phototoxic dermatitis. This means that exposure to the sun has reacted with something that has contacted the skin in the area of your rash.    We are prescribing a medication called tacrolimus. It is important to use this medication twice daily to be most effective.    If the rash does not go away within one month, please message us on [facility] to request a follow up appointment.    Otherwise, everything on your skin looks very healthy. We looked at the mole on your back and measured it again today. It looks the exact same today as your prior exam.     We will see you back in one year for a follow up skin exam.

## 2024-06-05 NOTE — PROGRESS NOTES
Subjective     Breanne Orta is a 53 y.o. female who presents for the following: Skin Check (Concern about redness on chin).     Patient presents for full body skin exam. She has a history of NMSC (BCC in 2008 on left temple) and moderately dysplastic nevus (2005 on back). At her last clinic visit in December 2023 she had a slightly atypical nevus on her back marked for observation. She denies that this lesion has grown nor changed in any way.   Otherwise she reports that 2 months ago she developed a rash on her chin. The rash is intermittently itchy but not overly bothersome. She was in [place] in the month of April when it occurred. Denies applying any specific products or exposures in this area of skin. She was seen by a physician at [facility] who prescribed Minocycline 100mg BID and Hydrocortisone-Iodoquinol cream. She states she has had minor improvement, perhaps 30 or 40%. However, because she was taking an antibiotic, patients states she stopped taking her Xeljanz for her ulcerative colitis. Now being off Xeljanz for 3 weeks she feels that her colitis may be starting to flare.     Review of Systems:  No other skin or systemic complaints other than what is documented elsewhere in the note.    The following portions of the chart were reviewed this encounter and updated as appropriate:         Skin Cancer History  BCC - left temple treated with Mohs in 2008  Moderately dysplastic nevus - lower back in 2005      Specialty Problems          Dermatology Problems    Actinic keratosis    Dyshidrosis (pompholyx)    Hemangioma of skin and subcutaneous tissue    Lentigines    Melanocytic nevi of ear and external auricular canal    Melanocytic nevi of right lower limb, including hip    Melanocytic nevi of scalp and neck    Melanocytic nevi of trunk    Melanocytic nevi of unspecified part of face    Melanocytic nevi of unspecified upper limb, including shoulder    Neoplasm of uncertain behavior of skin     Onycholysis    Other melanin hyperpigmentation    Other seborrheic keratosis    Personal history of other malignant neoplasm of skin    Scar condition and fibrosis of skin    Skin changes due to chronic exposure to nonionizing radiation, unspecified        Objective   Well appearing patient in no apparent distress; mood and affect are within normal limits.    A full examination was performed including scalp, head, eyes, ears, nose, lips, neck, chest, axillae, abdomen, back, buttocks, bilateral upper extremities, bilateral lower extremities, hands, feet, fingers, toes, fingernails, and toenails. All findings within normal limits unless otherwise noted below.    Assessment/Plan   1. Encounter for screening for malignant neoplasm of skin    Related Procedures  Follow Up In Dermatology - Established Patient    2. Personal history of skin cancer    Personal History of Non-Melanoma Skin Cancer  -Well healed scar(s) with no evidence of recurrence  -Discussed the need for annual or semi-annual skin examinations and to return sooner if any new or changing lesions are noticed. Patient verbalizes understanding    3. Dermatitis  Left Lower Cutaneous Lip, Mid Lower Cutaneous Lip, Right Lower Cutaneous Lip  Involving the chin and lower cutaneous lip there are thin pink and erythematous papules and thin plaques without much surface change          Dermatitis - Possibly Phototoxic Reaction vs Allergic Contact Dermatitis vs Other  - There is no eyelid involvement and only intermittent pruritus which makes allergic contact dermatitis less likely however the appearance of the dermatitis is otherwise fairly non-specific  - Could be induced by phototoxic reaction to lime or a sunscreen product. She was in [place] throughout April which is when she approximately things the rash began.  - Denies any specific products applied to this area of the skin.  - We recommend treatment with topical tacrolimus 0.1% ointment applied twice daily to  areas of rash until resolution  - Discussed that a phototoxic dermatitis can last for several months. If patient does not have resolution within a month she will message for follow up.  - Discontinue minocycline at this time as an infectious etiology is not suspected nor is rosacea.   - Discontinue hydrocortisone/iodoquinol cream at this time    Related Medications  tacrolimus (Protopic) 0.1 % ointment  Apply to area of rash on the chin twice daily until resolved    4. Neoplasm of uncertain behavior of skin  Left Lower Back          5. Seborrheic keratosis  Left Upper Back  Stuck on, waxy macule(s)/papule(s)/plaque(s) with comedo-like openings and milia like cysts    -Discussed the nature of the diagnosis  -Reassurance, recommend continued observation    6. Lentigo  Right Upper Back  Tan macules    -Benign appearing on exam  -Reassurance, recommend observation    7. Multiple benign nevi  Left Lower Back  Brown and tan macules and papules with reassuring findings on dermoscopy    -These lesions have benign, reassuring patterns on dermoscopy  -Recommend continued self observation, and to contact the office if any changes in nevi are noticed    8. Hemangioma of skin  Right Lower Back  Cherry red papules    -Discussed the nature of the diagnosis  -Reassurance, recommend continued observation    9. Actinic skin damage  Neck - Posterior  Background of photodamage with hyper- and hypo-pigmented macules on the skin      Benjamín Bae MD    I saw and evaluated the patient. I personally obtained the key and critical portions of the history and physical exam or was physically present for key and critical portions performed by the resident/fellow. I reviewed the resident/fellow's documentation and discussed the patient with the resident/fellow. I agree with the resident/fellow's medical decision making as documented in the note and made changes where appropriate.

## 2024-06-11 ENCOUNTER — APPOINTMENT (OUTPATIENT)
Dept: ENDOCRINOLOGY | Facility: CLINIC | Age: 54
End: 2024-06-11
Payer: COMMERCIAL

## 2024-06-11 VITALS
HEIGHT: 68 IN | WEIGHT: 137 LBS | DIASTOLIC BLOOD PRESSURE: 66 MMHG | HEART RATE: 63 BPM | SYSTOLIC BLOOD PRESSURE: 115 MMHG | BODY MASS INDEX: 20.76 KG/M2

## 2024-06-11 DIAGNOSIS — M81.8 OTHER OSTEOPOROSIS WITHOUT CURRENT PATHOLOGICAL FRACTURE: Primary | ICD-10-CM

## 2024-06-11 DIAGNOSIS — K51.90 ULCERATIVE COLITIS, UNSPECIFIED, WITHOUT COMPLICATIONS (MULTI): ICD-10-CM

## 2024-06-11 DIAGNOSIS — K21.9 GASTROESOPHAGEAL REFLUX DISEASE, UNSPECIFIED WHETHER ESOPHAGITIS PRESENT: ICD-10-CM

## 2024-06-11 PROCEDURE — 1036F TOBACCO NON-USER: CPT | Performed by: INTERNAL MEDICINE

## 2024-06-11 PROCEDURE — 99214 OFFICE O/P EST MOD 30 MIN: CPT | Performed by: INTERNAL MEDICINE

## 2024-06-11 RX ORDER — PANTOPRAZOLE SODIUM 40 MG/1
40 TABLET, DELAYED RELEASE ORAL
Qty: 90 TABLET | Refills: 1 | Status: SHIPPED | OUTPATIENT
Start: 2024-06-11

## 2024-06-11 RX ORDER — RISEDRONATE SODIUM 35 MG/1
35 TABLET, FILM COATED ORAL
Qty: 12 TABLET | Refills: 1 | Status: SHIPPED | OUTPATIENT
Start: 2024-06-11

## 2024-06-11 ASSESSMENT — PAIN SCALES - GENERAL: PAINLEVEL: 0-NO PAIN

## 2024-06-11 NOTE — PROGRESS NOTES
Chief Complaint: follow up on osteoporosis  History of Present Asiydpo13 year old F patient with known PMHx of UC, currently treated with Sulfasalazine, Xeljanz and Prednisone (2mg PO daily), patient referred by her GI specialist, Dr. Peterson for tapering off prednisone and evaluation of possible suppression of hypothalamic-pituitary-adrenal axis (secondary AI). Initial consultation completed [id number]. She was last seen in 6/2023.      Background   Patient reports diagnosis of UC since approximately 2001, with pancolitis. Reports a bad flare of UC around 2017, at which time patient was started on a Prednisone taper, believes initial dose was 40mg PO daily, but recalls quickly decreasing doses; at this time patient is taking prednisone 2mg PO daily, has been on same dose for almost 2 years. She has tried multiple times to taper off of the prednisone but has been unsuccessful due to flare ups of colitis symptoms. She endorses no symptoms of fatigue, lightheadedness, near syncope or weakness when her doses of prednisone are decreased, only reports GI upset concordant with her usual UC symptoms during step down in dose of prednisone. Reports attempting to decrease from 2mg to alternating doses of 2 and 1.5mg (attempted this twice, two months ago as well as few weeks ago). She was unable to tolerate this stepdown due to increased bowel activity, cramping and increased frequency of BMs. Symptoms resovled after going back to 2mg daily dosing.    Interaval HX:   Doing well; her colitis is better controlled now; but she is still taking 2 mg of prednisone daily (around 8 AM) No complaints; feels well all day long; active; has not tried to lower the dose of prednisone.   No sx of Adrenal insufficiency ; started omeprazole 2 months ago for ? Reflux  No fractures     .  EXAM    Constitutional: well-developed, well-nourished,  female, in no acute distress; no Cushingnoid features    HEENT: clear sclera, EOMI  Skin: Skin  is appropriately warm & dry.   Neck Supple. No lymphadenopathy   Pulmonary: Clear to auscultation bilaterally.   Musculoskeletal: No proximal muscle weakness   Neurologic : AAO3. No gait abnormalities. Deep tendon reflexes are normal and without delay in the return phase. Chvostek negative.  Psych: pleasant, cooperative    Impression     Doing well; will get DEXA Scan ; discuss the need to take Omeprazol  on an empty stomach and go over the use of resodrinate again and to avoid taking omperazole the night before taking resodrinate; return in one year. .    Doris Thompson MD

## 2024-06-20 ENCOUNTER — APPOINTMENT (OUTPATIENT)
Dept: DERMATOLOGY | Facility: CLINIC | Age: 54
End: 2024-06-20
Payer: COMMERCIAL

## 2024-07-15 DIAGNOSIS — K51.818 OTHER ULCERATIVE COLITIS WITH OTHER COMPLICATION (MULTI): ICD-10-CM

## 2024-07-15 RX ORDER — PREDNISONE 1 MG/1
2 TABLET ORAL DAILY
Qty: 60 TABLET | Refills: 3 | Status: SHIPPED | OUTPATIENT
Start: 2024-07-15

## 2024-08-13 ENCOUNTER — APPOINTMENT (OUTPATIENT)
Dept: DERMATOLOGY | Facility: CLINIC | Age: 54
End: 2024-08-13
Payer: COMMERCIAL

## 2024-08-21 ENCOUNTER — OFFICE VISIT (OUTPATIENT)
Dept: DERMATOLOGY | Facility: CLINIC | Age: 54
End: 2024-08-21
Payer: COMMERCIAL

## 2024-08-21 DIAGNOSIS — L30.9 DERMATITIS: Primary | ICD-10-CM

## 2024-08-21 PROCEDURE — 99214 OFFICE O/P EST MOD 30 MIN: CPT | Performed by: DERMATOLOGY

## 2024-08-21 RX ORDER — HYDROCORTISONE 25 MG/G
CREAM TOPICAL DAILY
Qty: 30 G | Refills: 3 | Status: SHIPPED | OUTPATIENT
Start: 2024-08-21 | End: 2024-09-04

## 2024-08-21 RX ORDER — KETOCONAZOLE 20 MG/G
CREAM TOPICAL DAILY
Qty: 60 G | Refills: 11 | Status: SHIPPED | OUTPATIENT
Start: 2024-08-21

## 2024-08-21 ASSESSMENT — DERMATOLOGY QUALITY OF LIFE (QOL) ASSESSMENT
RATE HOW BOTHERED YOU ARE BY SYMPTOMS OF YOUR SKIN PROBLEM (EG, ITCHING, STINGING BURNING, HURTING OR SKIN IRRITATION): 4
RATE HOW EMOTIONALLY BOTHERED YOU ARE BY YOUR SKIN PROBLEM (FOR EXAMPLE, WORRY, EMBARRASSMENT, FRUSTRATION): 4
RATE HOW EMOTIONALLY BOTHERED YOU ARE BY YOUR SKIN PROBLEM (FOR EXAMPLE, WORRY, EMBARRASSMENT, FRUSTRATION): 4
RATE HOW BOTHERED YOU ARE BY SYMPTOMS OF YOUR SKIN PROBLEM (EG, ITCHING, STINGING BURNING, HURTING OR SKIN IRRITATION): 1
RATE HOW EMOTIONALLY BOTHERED YOU ARE BY YOUR SKIN PROBLEM (FOR EXAMPLE, WORRY, EMBARRASSMENT, FRUSTRATION): 1
DATE THE QUALITY-OF-LIFE ASSESSMENT WAS COMPLETED: 67073
ARE THERE EXCLUSIONS OR EXCEPTIONS FOR THE QUALITY OF LIFE ASSESSMENT: NO
RATE HOW BOTHERED YOU ARE BY SYMPTOMS OF YOUR SKIN PROBLEM (EG, ITCHING, STINGING BURNING, HURTING OR SKIN IRRITATION): 4
RATE HOW BOTHERED YOU ARE BY EFFECTS OF YOUR SKIN PROBLEMS ON YOUR ACTIVITIES (EG, GOING OUT, ACCOMPLISHING WHAT YOU WANT, WORK ACTIVITIES OR YOUR RELATIONSHIPS WITH OTHERS): 2
RATE HOW BOTHERED YOU ARE BY EFFECTS OF YOUR SKIN PROBLEMS ON YOUR ACTIVITIES (EG, GOING OUT, ACCOMPLISHING WHAT YOU WANT, WORK ACTIVITIES OR YOUR RELATIONSHIPS WITH OTHERS): 1
RATE HOW BOTHERED YOU ARE BY EFFECTS OF YOUR SKIN PROBLEMS ON YOUR ACTIVITIES (EG, GOING OUT, ACCOMPLISHING WHAT YOU WANT, WORK ACTIVITIES OR YOUR RELATIONSHIPS WITH OTHERS): 2
WHAT SINGLE SKIN CONDITION LISTED BELOW IS THE PATIENT ANSWERING THE QUALITY-OF-LIFE ASSESSMENT QUESTIONS ABOUT: NONE OF THE ABOVE
DATE THE QUALITY-OF-LIFE ASSESSMENT WAS COMPLETED: 67073
WHAT SINGLE SKIN CONDITION LISTED BELOW IS THE PATIENT ANSWERING THE QUALITY-OF-LIFE ASSESSMENT QUESTIONS ABOUT: NONE OF THE ABOVE

## 2024-08-21 ASSESSMENT — DERMATOLOGY PATIENT ASSESSMENT
HAVE YOU HAD OR DO YOU HAVE VASCULAR DISEASE: NO
ARE YOU TRYING TO GET PREGNANT: NO
DO YOU HAVE IRREGULAR MENSTRUAL CYCLES: NO
DO YOU HAVE ANY NEW OR CHANGING LESIONS: NO
HAVE YOU HAD OR DO YOU HAVE A STAPH INFECTION: NO
ARE YOU ON BIRTH CONTROL: NO
ARE YOU AN ORGAN TRANSPLANT RECIPIENT: NO
DO YOU USE A TANNING BED: NO
DO YOU USE SUNSCREEN: OCCASIONALLY

## 2024-08-21 ASSESSMENT — PATIENT GLOBAL ASSESSMENT (PGA)
WHAT IS THE PGA: PATIENT GLOBAL ASSESSMENT:  3 - MODERATE
PATIENT GLOBAL ASSESSMENT: PATIENT GLOBAL ASSESSMENT:  1 - CLEAR

## 2024-08-21 ASSESSMENT — LIFESTYLE VARIABLES
3_OR_MORE_DRINKS_PER_DAY: N
CURRENT_SMOKER: N

## 2024-08-21 ASSESSMENT — ITCH NUMERIC RATING SCALE: HOW SEVERE IS YOUR ITCHING?: 3

## 2024-08-21 NOTE — PROGRESS NOTES
Subjective     Breanne Orta is a 54 y.o. female who presents for the following: Rash (On face).     Patient presents for worsening intermittently itchy/burning rash on the face. Last seen 6/5/2024 for this and skin check. Has been using prescribed tacrolimus 0.1% ointment BID without improvement and now rash involves other areas. Rash has now been there for a total of 4 months now. Denied any specific products or exposures to this area. No new medications or supplements. Was in [place] in April when this rash approximately began, so thought at last visit was likely a phototoxic dermatitis. Was seen before by [facility] physician who had started her on minocycline and hydrocortisone/iodoquinol cream for suspected rosacea, both of which were discontinued at last visit. Only things she is using on her face currently are the prescribed tacrolimus, cover up makeup, EltaMD moisture seal, and a gentle cleanser.    Review of Systems:  No other skin or systemic complaints other than what is documented elsewhere in the note.    The following portions of the chart were reviewed this encounter and updated as appropriate:          Skin Cancer History  BCC - left temple treated with Mohs in 2008  Moderately dysplastic nevus - lower back in 2005      Specialty Problems          Dermatology Problems    Actinic keratosis    Dyshidrosis (pompholyx)    Hemangioma of skin and subcutaneous tissue    Lentigines    Melanocytic nevi of ear and external auricular canal    Melanocytic nevi of right lower limb, including hip    Melanocytic nevi of scalp and neck    Melanocytic nevi of trunk    Melanocytic nevi of unspecified part of face    Melanocytic nevi of unspecified upper limb, including shoulder    Neoplasm of uncertain behavior of skin    Onycholysis    Other melanin hyperpigmentation    Other seborrheic keratosis    Personal history of other malignant neoplasm of skin    Scar condition and fibrosis of skin    Skin changes due to chronic  exposure to nonionizing radiation, unspecified        Objective   Well appearing patient in no apparent distress; mood and affect are within normal limits.    A focused skin examination was performed. All findings within normal limits unless otherwise noted below.    Assessment/Plan   1. Dermatitis  Head - Anterior (Face)  Involving the upper chin and bilateral nasolabial folds, there are erythematous patches with scattered papules and mild scaling.     - Upper chin erythema has improved since last visit; however, now there are new areas of involvement in the nasolabial folds.  - Tacrolimus may have helped treat contact or phototoxic dermatitis component; however, may have preempted yeast overgrowth/seb. derm component now affecting the nasolabial folds  - Will try treating seborrheic dermatitis given the mild scaling and nasolabial involvement with the following:  - Start using ketoconazole cream in morning to affected areas   - Start applying hydrocortisone 2.5% cream at night time to affected areas   - Stop tacrolimus ointment at this time  - Can consider allergic contact dermatitis and patch testing if still no improvement in the next few weeks; patient instructed to contact us on MyChart if no improvement.    ketoconazole (NIZOral) 2 % cream - Head - Anterior (Face)  Apply topically once daily. Apply in morning to the affected areas    hydrocortisone 2.5 % cream - Head - Anterior (Face)  Apply topically once daily for 14 days. Apply at night to affected areas      Follow up as needed.    Rizwan Foster MD  Department of Dermatology    I saw and evaluated the patient. I personally obtained the key and critical portions of the history and physical exam or was physically present for key and critical portions performed by the resident/fellow. I reviewed the resident/fellow's documentation and discussed the patient with the resident/fellow. I agree with the resident/fellow's medical decision making as documented  in the note and made changes where appropriate.

## 2024-08-27 ENCOUNTER — HOSPITAL ENCOUNTER (OUTPATIENT)
Dept: RADIOLOGY | Facility: CLINIC | Age: 54
Discharge: HOME | End: 2024-08-27
Payer: COMMERCIAL

## 2024-08-27 DIAGNOSIS — M81.8 OTHER OSTEOPOROSIS WITHOUT CURRENT PATHOLOGICAL FRACTURE: ICD-10-CM

## 2024-08-27 PROCEDURE — 77080 DXA BONE DENSITY AXIAL: CPT | Performed by: RADIOLOGY

## 2024-08-27 PROCEDURE — 77081 DXA BONE DENSITY APPENDICULR: CPT

## 2024-08-28 ENCOUNTER — APPOINTMENT (OUTPATIENT)
Dept: DERMATOLOGY | Facility: CLINIC | Age: 54
End: 2024-08-28
Payer: COMMERCIAL

## 2024-09-02 DIAGNOSIS — K51.80 OTHER ULCERATIVE COLITIS WITHOUT COMPLICATION (MULTI): ICD-10-CM

## 2024-09-03 RX ORDER — TOFACITINIB 22 MG/1
TABLET, FILM COATED, EXTENDED RELEASE ORAL
Qty: 90 TABLET | Refills: 3 | Status: SHIPPED | OUTPATIENT
Start: 2024-09-03

## 2024-09-05 DIAGNOSIS — L30.9 DERMATITIS: Primary | ICD-10-CM

## 2024-09-05 RX ORDER — DESONIDE 0.5 MG/G
OINTMENT TOPICAL DAILY
Qty: 60 G | Refills: 3 | Status: SHIPPED | OUTPATIENT
Start: 2024-09-05 | End: 2024-10-05

## 2024-09-26 ENCOUNTER — LAB (OUTPATIENT)
Dept: LAB | Facility: LAB | Age: 54
End: 2024-09-26
Payer: COMMERCIAL

## 2024-09-26 ENCOUNTER — OFFICE VISIT (OUTPATIENT)
Dept: GASTROENTEROLOGY | Facility: CLINIC | Age: 54
End: 2024-09-26
Payer: COMMERCIAL

## 2024-09-26 VITALS
HEIGHT: 68 IN | WEIGHT: 136 LBS | SYSTOLIC BLOOD PRESSURE: 94 MMHG | HEART RATE: 68 BPM | DIASTOLIC BLOOD PRESSURE: 59 MMHG | TEMPERATURE: 97.8 F | BODY MASS INDEX: 20.61 KG/M2

## 2024-09-26 DIAGNOSIS — K51.818 OTHER ULCERATIVE COLITIS WITH OTHER COMPLICATION: ICD-10-CM

## 2024-09-26 DIAGNOSIS — K51.818 OTHER ULCERATIVE COLITIS WITH OTHER COMPLICATION: Primary | ICD-10-CM

## 2024-09-26 LAB
ALBUMIN SERPL BCP-MCNC: 4.3 G/DL (ref 3.4–5)
ALP SERPL-CCNC: 71 U/L (ref 33–110)
ALT SERPL W P-5'-P-CCNC: 29 U/L (ref 7–45)
ANION GAP SERPL CALC-SCNC: 13 MMOL/L (ref 10–20)
AST SERPL W P-5'-P-CCNC: 38 U/L (ref 9–39)
BASOPHILS # BLD AUTO: 0.08 X10*3/UL (ref 0–0.1)
BASOPHILS NFR BLD AUTO: 0.8 %
BILIRUB SERPL-MCNC: 0.4 MG/DL (ref 0–1.2)
BUN SERPL-MCNC: 24 MG/DL (ref 6–23)
CALCIUM SERPL-MCNC: 9.7 MG/DL (ref 8.6–10.6)
CHLORIDE SERPL-SCNC: 101 MMOL/L (ref 98–107)
CO2 SERPL-SCNC: 28 MMOL/L (ref 21–32)
CREAT SERPL-MCNC: 0.94 MG/DL (ref 0.5–1.05)
CRP SERPL-MCNC: 0.53 MG/DL
EGFRCR SERPLBLD CKD-EPI 2021: 71 ML/MIN/1.73M*2
EOSINOPHIL # BLD AUTO: 0.14 X10*3/UL (ref 0–0.7)
EOSINOPHIL NFR BLD AUTO: 1.5 %
ERYTHROCYTE [DISTWIDTH] IN BLOOD BY AUTOMATED COUNT: 13.6 % (ref 11.5–14.5)
GLUCOSE SERPL-MCNC: 87 MG/DL (ref 74–99)
HCT VFR BLD AUTO: 43.9 % (ref 36–46)
HGB BLD-MCNC: 14.4 G/DL (ref 12–16)
IMM GRANULOCYTES # BLD AUTO: 0.04 X10*3/UL (ref 0–0.7)
IMM GRANULOCYTES NFR BLD AUTO: 0.4 % (ref 0–0.9)
LYMPHOCYTES # BLD AUTO: 0.83 X10*3/UL (ref 1.2–4.8)
LYMPHOCYTES NFR BLD AUTO: 8.7 %
MCH RBC QN AUTO: 32.4 PG (ref 26–34)
MCHC RBC AUTO-ENTMCNC: 32.8 G/DL (ref 32–36)
MCV RBC AUTO: 99 FL (ref 80–100)
MONOCYTES # BLD AUTO: 0.94 X10*3/UL (ref 0.1–1)
MONOCYTES NFR BLD AUTO: 9.8 %
NEUTROPHILS # BLD AUTO: 7.56 X10*3/UL (ref 1.2–7.7)
NEUTROPHILS NFR BLD AUTO: 78.8 %
NRBC BLD-RTO: 0 /100 WBCS (ref 0–0)
PLATELET # BLD AUTO: 354 X10*3/UL (ref 150–450)
POTASSIUM SERPL-SCNC: 4.5 MMOL/L (ref 3.5–5.3)
PROT SERPL-MCNC: 7.7 G/DL (ref 6.4–8.2)
RBC # BLD AUTO: 4.44 X10*6/UL (ref 4–5.2)
SODIUM SERPL-SCNC: 137 MMOL/L (ref 136–145)
WBC # BLD AUTO: 9.6 X10*3/UL (ref 4.4–11.3)

## 2024-09-26 PROCEDURE — 99214 OFFICE O/P EST MOD 30 MIN: CPT | Performed by: INTERNAL MEDICINE

## 2024-09-26 PROCEDURE — 36415 COLL VENOUS BLD VENIPUNCTURE: CPT

## 2024-09-26 PROCEDURE — 85025 COMPLETE CBC W/AUTO DIFF WBC: CPT

## 2024-09-26 PROCEDURE — 80053 COMPREHEN METABOLIC PANEL: CPT

## 2024-09-26 PROCEDURE — 3008F BODY MASS INDEX DOCD: CPT | Performed by: INTERNAL MEDICINE

## 2024-09-26 PROCEDURE — 86481 TB AG RESPONSE T-CELL SUSP: CPT

## 2024-09-26 PROCEDURE — 86140 C-REACTIVE PROTEIN: CPT

## 2024-09-26 RX ORDER — BUDESONIDE AND FORMOTEROL FUMARATE DIHYDRATE 160; 4.5 UG/1; UG/1
2 AEROSOL RESPIRATORY (INHALATION) 2 TIMES DAILY
COMMUNITY
Start: 2024-09-17

## 2024-09-26 RX ORDER — HYDROCORTISONE, IODOQUINOL 10; 10 MG/G; MG/G
CREAM TOPICAL
COMMUNITY
Start: 2024-05-18

## 2024-09-26 RX ORDER — ZOLPIDEM TARTRATE 10 MG/1
TABLET ORAL
COMMUNITY
Start: 2024-08-22

## 2024-09-26 NOTE — PATIENT INSTRUCTIONS
Thank you for coming in for follow-up today.  It is great that you, overall, you are feeling well.  As we reviewed today, we will plan for a colonoscopy and then discuss whether treatment changes are indicated following the completion of the procedure.    PLAN  1) check labs  2) continue Xeljanz 22 mg once daily  3) continue prednisone 2 mg daily  4) colonoscopy at the [facility] in early 2025  5) contact the office if symptoms worsen or with other questions or concerns

## 2024-09-26 NOTE — PROGRESS NOTES
"REASON FOR VISIT:  ,ulcerative colitis    HPI:  Breanne Orta is a 54 y.o. female who presents for follow-up of UC.  Diagnosed as proctitis in 2001. Mostly mild disease over the years, but increasingly active since 2014.  Started flaring in May 2017 with progressively worse disease. Tried and failed Humira, Remicade, and Entyvio and has been steroid dependent (2-5 mg) since fall 2017. Colectomy discussed and has met with several surgeons, but decided against it for now.  Started on XELJANZ 10/2019 and has generally improved with ability to taper prednisone, but has not been able to stop prednisone. 2 mg daily     Saw Dr. Thompson 6/2022 and no adrenal insufficiency. Bone density stable on residronate and vitamin D.     9/2022 felt great with 2-3 stools in morning and rest of the day 1-2. Still some urgency. No blood. Rare nocturnal stools. Stool not loose. Feels well. No pain. If not for urgency, everything would \"be perfect\". She did try mesalamine for a while, didn't think it helped at all. Continued Xeljanz 22 mg once daily and prednisone 2 mg daily.     1/2023 colonoscopy with Medina 3 disease in rectum, Medina 2 disease in sigmoid, and medina 0 disease in rest of colon.  Pathology pancolitis without dysplasia.    Last seen 12/2023 and felt great.  Prednisone at 2 mg daily, sulfasalazine 3 daily in AM, and Xeljanz 22 mg daily.  Bowels twice in AM and rest of the day OK.  Still with urgency.  On occasion will still have incontinence, usually happens when walking after she eats.  Never uses loperamide before goes for a walk.  Overall QoL was good.  Plan was to stop sulfasalazine, try loperamide daily, and see if could lower prednisone to 1 mg daily.      In follow-up today, \"feels great\".  Stool frequency is 3-4 times in AM, then rest of the day OK.  Continues on Xeljanz  22 mg daily and prednisone 2  mg daily.  Able to take walks in the evening without accidents as long as stays on schedule.  Recently on vacation and " "had an accident when off schedule.  Urgency remains the biggest issue.      Can eat whatever she wants.  Feels really well.  No issues with Xeljanz.      REVIEW OF SYSTEMS  Complete review of systems otherwise negative per complaint    No Known Allergies    Past Medical History:   Diagnosis Date    Other conjunctivitis 07/13/2017    Giant papillary conjunctivitis       Past Surgical History:   Procedure Laterality Date    OTHER SURGICAL HISTORY  10/01/2019    Oophorectomy       Current Outpatient Medications   Medication Sig Dispense Refill    desonide (DesOwen) 0.05 % ointment Apply topically once daily. 60 g 3    ergocalciferol (Vitamin D-2) 1.25 MG (84691 UT) capsule TAKE 1 CAPSULE BY MOUTH EVERY OTHER WEEK 2 capsule 11    escitalopram (Lexapro) 10 mg tablet Take 1 tablet (10 mg) by mouth once daily.      folic acid (Folvite) 800 mcg tablet Take 1 tablet (800 mcg) by mouth once daily.      gabapentin (Neurontin) 300 mg capsule Take 1 capsule (300 mg) by mouth 2 times a day.      hydrocortisone 2.5 % cream Apply topically once daily for 14 days. Apply at night to affected areas 30 g 3    ketoconazole (NIZOral) 2 % cream Apply topically once daily. Apply in morning to the affected areas 60 g 11    pantoprazole (ProtoNix) 40 mg EC tablet Take 1 tablet (40 mg) by mouth once daily in the morning. Take before meals. 90 tablet 1    predniSONE (Deltasone) 1 mg tablet Take 2 tablets (2 mg) by mouth once daily. 60 tablet 3    risedronate (Actonel) 35 mg tablet Take 1 tablet (35 mg) by mouth every 7 days. 12 tablet 1    tofacitinib (Xeljanz XR) 22 mg tablet extended release 24 hr TAKE 1 TABLET DAILY 90 tablet 3     No current facility-administered medications for this visit.       PHYSICAL EXAM:  BP 94/59   Pulse 68   Temp 36.6 °C (97.8 °F)   Ht 1.727 m (5' 8\")   Wt 61.7 kg (136 lb)   LMP 05/01/2022   BMI 20.68 kg/m²   Vital signs reviewed.  Weight is up around 6 lbs in one year.  Patient alert and oriented in no " acute distress  Anicteric  No cervical adenopathy  Cardiac exam regular rate and rhythm S1-S2 without murmurs gallops or rubs  Lungs clear to auscultation bilaterally  Abdomen soft and nontender without organomegaly or mass.  No rebound or guarding.  Bowel sounds present  Extremities without edema      Lab Results   Component Value Date    WBC 6.0 12/21/2023    HGB 13.1 12/21/2023    HCT 39.7 12/21/2023     (H) 12/21/2023     12/21/2023     Lab Results   Component Value Date    ALT 25 12/21/2023    AST 37 12/21/2023    ALKPHOS 71 12/21/2023    BILITOT 0.4 12/21/2023       ASSESSMENT  #UC- overall, clinically stable on Xeljanz 22 mg daily and prednisone 2 mg daily.  She continues with symptoms of urgency, but otherwise feels she has a good quality of life.  Her last colonoscopy in January 2023 did show ongoing significant inflammation in the sigmoid colon.  It would be worth considering changing her to alternative therapies if her next colonoscopy continues to show active inflammation.  Her last fecal calprotectin, however, was not particularly elevated.    We will not make any changes in treatment at this time, but rather plan for her surveillance colonoscopy in early 2025.  We will then make adjustments in treatment based on what we see.  I have thought that some of her symptoms of urgency related to chronic rectal damage over time due to ongoing chronic inflammation, and now for 25 years.    She is due for some lab monitoring.    PLAN  1) check labs  2) continue Xeljanz 22 mg once daily  3) continue prednisone 2 mg daily  4) colonoscopy at the [facility] in early 2025  5) contact the office if symptoms worsen or with other questions or concerns

## 2024-09-28 LAB
NIL(NEG) CONTROL SPOT COUNT: NORMAL
PANEL A SPOT COUNT: 0
PANEL B SPOT COUNT: 0
POS CONTROL SPOT COUNT: NORMAL
T-SPOT. TB INTERPRETATION: NEGATIVE

## 2024-10-09 ENCOUNTER — APPOINTMENT (OUTPATIENT)
Dept: DERMATOLOGY | Facility: CLINIC | Age: 54
End: 2024-10-09
Payer: COMMERCIAL

## 2024-10-14 ENCOUNTER — DOCUMENTATION (OUTPATIENT)
Dept: GASTROENTEROLOGY | Facility: HOSPITAL | Age: 54
End: 2024-10-14
Payer: COMMERCIAL

## 2024-10-14 NOTE — PROGRESS NOTES
Patient contacted office.  Seen recently and was doing well.  Shortly afterwards, diagnosed with UTI. Started on Cipro for 7 days and held Xeljanz.  Reported increased symptoms with urgency and frequency.  After antibiotic stopped and back on the Xeljanz, improving.  Told to contact office if symptoms persist/worsen.

## 2024-10-18 ENCOUNTER — APPOINTMENT (OUTPATIENT)
Dept: OBSTETRICS AND GYNECOLOGY | Facility: CLINIC | Age: 54
End: 2024-10-18
Payer: COMMERCIAL

## 2024-10-18 VITALS
HEIGHT: 68 IN | DIASTOLIC BLOOD PRESSURE: 66 MMHG | HEART RATE: 73 BPM | SYSTOLIC BLOOD PRESSURE: 99 MMHG | BODY MASS INDEX: 20.68 KG/M2

## 2024-10-18 DIAGNOSIS — Z01.419 ENCOUNTER FOR GYNECOLOGICAL EXAMINATION WITHOUT ABNORMAL FINDING: ICD-10-CM

## 2024-10-18 DIAGNOSIS — N95.1 VAGINAL DRYNESS, MENOPAUSAL: Primary | ICD-10-CM

## 2024-10-18 PROCEDURE — 87624 HPV HI-RISK TYP POOLED RSLT: CPT

## 2024-10-18 PROCEDURE — 88175 CYTOPATH C/V AUTO FLUID REDO: CPT

## 2024-10-18 RX ORDER — ESTRADIOL 0.1 MG/G
0.5 CREAM VAGINAL 3 TIMES WEEKLY
Qty: 42.5 G | Refills: 3 | Status: SHIPPED | OUTPATIENT
Start: 2024-10-18 | End: 2025-10-18

## 2024-10-18 NOTE — PROGRESS NOTES
Subjective   Breanne Orta is a 54 y.o. female here for a routine exam.  There is no postmenopausal bleeding or discharge.  No dysuria or change in bowel habits.    She is current on her colonoscopy, she has colitis.    A bone density in 2024 showed osteopenia, T-score -1.2.  She is on risedronate and vitamin D.  She is followed by Rheumatology.  She does mention a recent UTI, the symptoms have resolved.    Her twins are attending [employer], the other at [employer].    Personal health questionnaire reviewed: yes.     Gynecologic History  Patient's last menstrual period was 2022.  Contraception: post menopausal status  Last Pap: 21. Results were: normal  Last mammogram: 23. Results were: normal    Obstetric History  OB History    Para Term  AB Living   4 4           SAB IAB Ectopic Multiple Live Births         1        # Outcome Date GA Lbr Cruz/2nd Weight Sex Type Anes PTL Lv   4 Para            3 Para            2 Para            1 Para                Objective   Constitutional: Alert and in no acute distress. Well developed, well nourished.   Head and Face: Head and face: Normal.    Eyes: Normal external exam - nonicteric sclera, extraocular movements intact (EOMI) and no ptosis.   Neck: No neck asymmetry. Supple. Thyroid not enlarged and there were no palpable thyroid nodules.    Pulmonary: No respiratory distress.   Chest: Breasts: Normal appearance, no nipple discharge and no skin changes. Palpation of breasts and axillae: No palpable mass and no axillary lymphadenopathy.   Abdomen: Soft nontender; no abdominal mass palpated. No organomegaly. No hernias.   Genitourinary: External genitalia: Normal. No inguinal lymphadenopathy. Bartholin's Urethral and Skenes Glands: Normal. Urethra: Normal.  Bladder: Normal on palpation. Vagina: Normal. Cervix: Normal.  Uterus: Normal.  Right Adnexa/parametria: Normal.  Left Adnexa/parametria: Normal.  Inspection of Perianal Area:  Normal.   Musculoskeletal: No joint swelling seen, normal movements of all extremities.   Skin: Normal skin color and pigmentation, normal skin turgor, and no rash.   Neurologic: Non-focal. Grossly intact.   Psychiatric: Alert and oriented x 3. Affect normal to patient baseline. Mood: Appropriate.  Physical Exam     Assessment/Plan   Healthy female exam.  This is a 54-year-old female with a normal exam.  A Pap smear was sent.    Her routine mammogram was ordered with tomosynthesis.    We discussed the menopausal symptoms and over-the-counter options such as Estroven, black cohosh.  She was given information on Thermella from [employer].    I will see her routinely in 1 year.  Mammogram ordered.

## 2024-11-05 ENCOUNTER — APPOINTMENT (OUTPATIENT)
Dept: RADIOLOGY | Facility: CLINIC | Age: 54
End: 2024-11-05
Payer: COMMERCIAL

## 2024-11-17 DIAGNOSIS — K51.818 OTHER ULCERATIVE COLITIS WITH OTHER COMPLICATION: ICD-10-CM

## 2024-11-18 RX ORDER — PREDNISONE 1 MG/1
2 TABLET ORAL DAILY
Qty: 60 TABLET | Refills: 3 | Status: SHIPPED | OUTPATIENT
Start: 2024-11-18

## 2024-11-26 ENCOUNTER — HOSPITAL ENCOUNTER (OUTPATIENT)
Dept: RADIOLOGY | Facility: CLINIC | Age: 54
Discharge: HOME | End: 2024-11-26
Payer: COMMERCIAL

## 2024-11-26 VITALS — WEIGHT: 136 LBS | BODY MASS INDEX: 20.61 KG/M2 | HEIGHT: 68 IN

## 2024-11-26 DIAGNOSIS — Z01.419 ENCOUNTER FOR GYNECOLOGICAL EXAMINATION WITHOUT ABNORMAL FINDING: ICD-10-CM

## 2024-11-26 PROCEDURE — 77063 BREAST TOMOSYNTHESIS BI: CPT

## 2024-12-06 ENCOUNTER — TELEPHONE (OUTPATIENT)
Dept: OPHTHALMOLOGY | Age: 54
End: 2024-12-06
Payer: COMMERCIAL

## 2024-12-07 ENCOUNTER — APPOINTMENT (OUTPATIENT)
Dept: OPHTHALMOLOGY | Facility: CLINIC | Age: 54
End: 2024-12-07
Payer: COMMERCIAL

## 2024-12-11 ENCOUNTER — TELEPHONE (OUTPATIENT)
Dept: OPHTHALMOLOGY | Age: 54
End: 2024-12-11
Payer: COMMERCIAL

## 2024-12-18 ENCOUNTER — APPOINTMENT (OUTPATIENT)
Dept: DERMATOLOGY | Facility: CLINIC | Age: 54
End: 2024-12-18
Payer: COMMERCIAL

## 2024-12-18 DIAGNOSIS — L81.4 LENTIGO: ICD-10-CM

## 2024-12-18 DIAGNOSIS — D22.9 MULTIPLE BENIGN NEVI: ICD-10-CM

## 2024-12-18 DIAGNOSIS — L30.9 DERMATITIS: Primary | ICD-10-CM

## 2024-12-18 DIAGNOSIS — Z12.83 ENCOUNTER FOR SCREENING FOR MALIGNANT NEOPLASM OF SKIN: ICD-10-CM

## 2024-12-18 DIAGNOSIS — L23.89 OTHER ALLERGIC CONTACT DERMATITIS: ICD-10-CM

## 2024-12-18 DIAGNOSIS — L57.0 ACTINIC KERATOSIS: ICD-10-CM

## 2024-12-18 DIAGNOSIS — L82.1 SEBORRHEIC KERATOSIS: ICD-10-CM

## 2024-12-18 DIAGNOSIS — Z85.828 PERSONAL HISTORY OF SKIN CANCER: ICD-10-CM

## 2024-12-18 DIAGNOSIS — L72.0 MILIA: ICD-10-CM

## 2024-12-18 PROCEDURE — 17000 DESTRUCT PREMALG LESION: CPT | Performed by: STUDENT IN AN ORGANIZED HEALTH CARE EDUCATION/TRAINING PROGRAM

## 2024-12-18 PROCEDURE — 99214 OFFICE O/P EST MOD 30 MIN: CPT | Performed by: DERMATOLOGY

## 2024-12-18 ASSESSMENT — DERMATOLOGY QUALITY OF LIFE (QOL) ASSESSMENT
DATE THE QUALITY-OF-LIFE ASSESSMENT WAS COMPLETED: 67192
ARE THERE EXCLUSIONS OR EXCEPTIONS FOR THE QUALITY OF LIFE ASSESSMENT: NO
RATE HOW EMOTIONALLY BOTHERED YOU ARE BY YOUR SKIN PROBLEM (FOR EXAMPLE, WORRY, EMBARRASSMENT, FRUSTRATION): 0 - NEVER BOTHERED
RATE HOW BOTHERED YOU ARE BY EFFECTS OF YOUR SKIN PROBLEMS ON YOUR ACTIVITIES (EG, GOING OUT, ACCOMPLISHING WHAT YOU WANT, WORK ACTIVITIES OR YOUR RELATIONSHIPS WITH OTHERS): 0 - NEVER BOTHERED
RATE HOW BOTHERED YOU ARE BY SYMPTOMS OF YOUR SKIN PROBLEM (EG, ITCHING, STINGING BURNING, HURTING OR SKIN IRRITATION): 0 - NEVER BOTHERED

## 2024-12-18 ASSESSMENT — DERMATOLOGY PATIENT ASSESSMENT
DO YOU USE A TANNING BED: NO
ARE YOU ON BIRTH CONTROL: NO
DO YOU USE SUNSCREEN: OCCASIONALLY
HAVE YOU HAD OR DO YOU HAVE A STAPH INFECTION: NO
ARE YOU AN ORGAN TRANSPLANT RECIPIENT: NO
DO YOU HAVE IRREGULAR MENSTRUAL CYCLES: NO
DO YOU HAVE ANY NEW OR CHANGING LESIONS: NO
HAVE YOU HAD OR DO YOU HAVE VASCULAR DISEASE: NO
ARE YOU TRYING TO GET PREGNANT: NO

## 2024-12-18 ASSESSMENT — PATIENT GLOBAL ASSESSMENT (PGA): PATIENT GLOBAL ASSESSMENT: PATIENT GLOBAL ASSESSMENT:  1 - CLEAR

## 2024-12-18 ASSESSMENT — ITCH NUMERIC RATING SCALE: HOW SEVERE IS YOUR ITCHING?: 0

## 2024-12-18 NOTE — PATIENT INSTRUCTIONS
It was great to see you in Dr. Rosado's clinic today. We did a full body skin check and the one spot on your left forehead is a pre-cancer (actinic keratosis). We treated this with liquid nitrogen. It will be red and crusty for a few days and you can apply Vaseline if it bothers you.    For the rash on the face, we think it may be a contact dermatitis. This means there is a possibility you could be allergic to something in the environment. There are a couple ways to manage this. We could continue trying different creams, some of which may be expensive as they are newer and more specialized, and you can use make up to cover up the rash. We could also schedule for patch testing which tests for the 80 most common allergens in the [place]. This is a 7 day process where you cannot get your back wet and requires 3 visits. This would help identify an allergen that you could then actively avoid, but there is a risk the patch testing does not identify an allergen.    For now, we will prescribe a medication called Vtama (tapinarof) cream. Use this every morning and continue using the desonide cream at night time. If you are having trouble getting the medication from your pharmacy, we can send it to another pharmacy. Please let us know if this is the case.    We can do another skin check in 6 months.

## 2024-12-18 NOTE — PROGRESS NOTES
Subjective     Breanne Orta is a 54 y.o. female who presents for the following: Skin Check (FBSE, spots of concern on forehead) and Rash. She noticed a spot on her forehead a couple months ago that is near the scar of her previous BCC. She also has growths on her neck and her right wrist. She has not had a skin cancer since 2008 but prefers twice yearly skin exams due to family history of melanoma and history of sun exposure when she was younger.    For the rash on the face, she notes it is better today, but the severity waxes and wanes. It is primarily involving perinasal and perioral skin and she sometimes gets bumps around the mouth. She has tried minocycline and hydrocortisone/iodoquinol in the past without improvement. She also tried tacrolimus ointment when the rash was primarily involving the chin. At the next visit, it appeared to involve the perinasal area more prominently and it was thought that use of tacrolimus ointment may have triggered seborrheic dermatitis. She was prescribed ketoconazole cream and hydrocortisone cream which she used daily for two weeks without improvement. Since September, she has been using hydrocortisone cream nightly and desonide cream daily.     Review of Systems:  No other skin or systemic complaints other than what is documented elsewhere in the note.    The following portions of the chart were reviewed this encounter and updated as appropriate:         Skin Cancer History  BCC - left temple treated with Mohs in 2008  Moderately dysplastic nevus - lower back in 2005      Specialty Problems          Dermatology Problems    Actinic keratosis    Dyshidrosis (pompholyx)    Hemangioma of skin and subcutaneous tissue    Lentigines    Melanocytic nevi of ear and external auricular canal    Melanocytic nevi of right lower limb, including hip    Melanocytic nevi of scalp and neck    Melanocytic nevi of trunk    Melanocytic nevi of unspecified part of face    Melanocytic nevi of  unspecified upper limb, including shoulder    Neoplasm of uncertain behavior of skin    Onycholysis    Other melanin hyperpigmentation    Other seborrheic keratosis    Personal history of other malignant neoplasm of skin    Scar condition and fibrosis of skin    Skin changes due to chronic exposure to nonionizing radiation, unspecified        Objective   Well appearing patient in no apparent distress; mood and affect are within normal limits.    A full examination was performed including scalp, head, eyes, ears, nose, lips, neck, chest, axillae, abdomen, back, buttocks, bilateral upper extremities, bilateral lower extremities, hands, feet, fingers, toes, fingernails, and toenails. All findings within normal limits unless otherwise noted below.    Assessment/Plan   1. Dermatitis  Head - Anterior (Face)  Mild erythema of perinasal folds and perioral skin. There is a scaly ill-defined erythematous macule at the right oral commissure.    - Leading diagnosis is allergic contact dermatitis. Patient did not improve with treatments for periorificial dermatitis including minocycline  - Previous treatments including tacrolimus ointment, ketoconazole cream for suspected seborrheic dermatitis, currently using desonide cream and hydrocortisone cream daily  - Patient has used each treatment as prescribed for at least 2 weeks, several for many months  - Discussed options for management today including treating with topical medications and using makeup vs patch testing. Reviewed the risks of cost and coverage with more expensive medications and not identifying an allergen on patch testing.  - Patient will let us know if she would like to pursue patch testing. Would use standard tray.  - Continue desonide cream at night  - Start Vtama (tapinarof) cream in the mornings. Prescription sent to [facility] pharmacy. If unable to obtain medication through this pharmacy, asked patient to contact us and we can send this to [facility].    tapinarof 1 % cream - Head - Anterior (Face)  Apply 1 Application topically once daily.    Related Medications  ketoconazole (NIZOral) 2 % cream  Apply topically once daily. Apply in morning to the affected areas    hydrocortisone 2.5 % cream  Apply topically once daily for 14 days. Apply at night to affected areas    2. Other allergic contact dermatitis    3. Actinic keratosis  Left Forehead  Erythematous macules with gritty scale.    Destr of lesion - Left Forehead  Complexity: simple    Destruction method: cryotherapy    Informed consent: discussed and consent obtained    Lesion destroyed using liquid nitrogen: Yes    Region frozen until ice ball extended beyond lesion: Yes    Cryotherapy cycles:  1  Outcome: patient tolerated procedure well with no complications    Post-procedure details: wound care instructions given      4. Encounter for screening for malignant neoplasm of skin    Related Procedures  Follow Up In Dermatology - Established Patient    5. Multiple benign nevi  Brown and tan macules and papules with reassuring findings on dermoscopy    -These lesions have benign, reassuring patterns on dermoscopy  -Recommend continued self observation, and to contact the office if any changes in nevi are noticed    6. Lentigo  Tan macules    -Benign appearing on exam  -Reassurance, recommend observation    7. Seborrheic keratosis  Stuck on, waxy macule(s)/papule(s)/plaque(s) with comedo-like openings and milia like cysts    -Discussed the nature of the diagnosis  -Reassurance, recommend continued observation    8. Milia  Right Anterior Neck  Minute yellow-white papule    -Reviewed nature of diagnosis  -Attempted to remove with 11 blade today and CTAs today  -Reassurance, recommend continued observation    9. Personal history of skin cancer  Head - Anterior (Face)    Personal History of Non-Melanoma Skin Cancer  -Well healed scar with no evidence of recurrence  -Discussed the need for annual or  semi-annual skin examinations and to return sooner if any new or changing lesions are noticed. Patient verbalizes understanding  -BCC of the left forehead in 2008 is most recent skin cancer. Discussed that she may do yearly skin checks, however she prefers [id number] skin checks due to family history of melanoma and personal history of sun exposure.      Bushra Petersen MD    I saw and evaluated the patient. I personally obtained the key and critical portions of the history and physical exam or was physically present for key and critical portions performed by the resident/fellow. I reviewed the resident/fellow's documentation and discussed the patient with the resident/fellow. I agree with the resident/fellow's medical decision making as documented in the note and made changes where appropriate.

## 2024-12-27 DIAGNOSIS — E55.9 VITAMIN D DEFICIENCY: ICD-10-CM

## 2024-12-27 RX ORDER — ERGOCALCIFEROL 1.25 MG/1
1 CAPSULE ORAL
Qty: 2 CAPSULE | Refills: 11 | Status: SHIPPED | OUTPATIENT
Start: 2024-12-27

## 2025-01-06 DIAGNOSIS — K51.90 ULCERATIVE COLITIS, UNSPECIFIED, WITHOUT COMPLICATIONS (MULTI): ICD-10-CM

## 2025-01-06 RX ORDER — RISEDRONATE SODIUM 35 MG/1
35 TABLET, FILM COATED ORAL
Qty: 12 TABLET | Refills: 1 | Status: SHIPPED | OUTPATIENT
Start: 2025-01-06

## 2025-02-24 ENCOUNTER — APPOINTMENT (OUTPATIENT)
Dept: OPHTHALMOLOGY | Facility: CLINIC | Age: 55
End: 2025-02-24
Payer: COMMERCIAL

## 2025-02-24 DIAGNOSIS — H25.13 NUCLEAR SCLEROTIC CATARACT OF BOTH EYES: Primary | ICD-10-CM

## 2025-02-24 DIAGNOSIS — H52.03 HYPERMETROPIA OF BOTH EYES: ICD-10-CM

## 2025-02-24 DIAGNOSIS — H52.223 REGULAR ASTIGMATISM OF BOTH EYES: ICD-10-CM

## 2025-02-24 DIAGNOSIS — H52.4 PRESBYOPIA: ICD-10-CM

## 2025-02-24 PROCEDURE — FLVLF CONTACT LENS EVALUATION (SP): Performed by: OPTOMETRIST

## 2025-02-24 PROCEDURE — 99214 OFFICE O/P EST MOD 30 MIN: CPT | Performed by: OPTOMETRIST

## 2025-02-24 PROCEDURE — 92015 DETERMINE REFRACTIVE STATE: CPT | Performed by: OPTOMETRIST

## 2025-02-24 ASSESSMENT — CONF VISUAL FIELD
OS_SUPERIOR_TEMPORAL_RESTRICTION: 0
OD_SUPERIOR_TEMPORAL_RESTRICTION: 0
OS_INFERIOR_NASAL_RESTRICTION: 0
OS_INFERIOR_TEMPORAL_RESTRICTION: 0
METHOD: COUNTING FINGERS
OS_SUPERIOR_NASAL_RESTRICTION: 0
OD_INFERIOR_NASAL_RESTRICTION: 0
OD_INFERIOR_TEMPORAL_RESTRICTION: 0
OS_NORMAL: 1
OD_SUPERIOR_NASAL_RESTRICTION: 0
OD_NORMAL: 1

## 2025-02-24 ASSESSMENT — REFRACTION_MANIFEST
OS_SPHERE: +5.25
OD_ADD: +2.50
OS_CYLINDER: -0.75
OD_SPHERE: +4.75
OS_ADD: +2.50
OS_AXIS: 090
OD_AXIS: 105
OD_CYLINDER: -0.25

## 2025-02-24 ASSESSMENT — REFRACTION
OS_AXIS: 090
OD_CYLINDER: -0.25
OS_CYLINDER: -0.75
OD_ADD: +2.50
OD_SPHERE: +5.25
OD_AXIS: 105
OS_SPHERE: +5.25
OS_ADD: +2.50

## 2025-02-24 ASSESSMENT — REFRACTION_CURRENTRX
OD_BRAND: DAILIES AQUACOMFORT PLUS
OD_BASECURVE: 8.7
OD_SPHERE: +5.50
OD_CYLINDER: SPHERE
OS_BRAND: DAILIES AQUACOMFORT PLUS
OD_DIAMETER: 14.0
OS_SPHERE: +5.50
OS_DIAMETER: 14.0
OS_BASECURVE: 8.7
OS_CYLINDER: SPHERE

## 2025-02-24 ASSESSMENT — TONOMETRY
OS_IOP_MMHG: 16
OD_IOP_MMHG: 15
IOP_METHOD: GOLDMANN APPLANATION

## 2025-02-24 ASSESSMENT — ENCOUNTER SYMPTOMS
CONSTITUTIONAL NEGATIVE: 0
RESPIRATORY NEGATIVE: 0
CARDIOVASCULAR NEGATIVE: 0
NEUROLOGICAL NEGATIVE: 0
PSYCHIATRIC NEGATIVE: 0
MUSCULOSKELETAL NEGATIVE: 0
EYES NEGATIVE: 0
GASTROINTESTINAL NEGATIVE: 0
HEMATOLOGIC/LYMPHATIC NEGATIVE: 0
ENDOCRINE NEGATIVE: 0
ALLERGIC/IMMUNOLOGIC NEGATIVE: 0

## 2025-02-24 ASSESSMENT — EXTERNAL EXAM - LEFT EYE: OS_EXAM: NORMAL

## 2025-02-24 ASSESSMENT — VISUAL ACUITY
METHOD: SNELLEN - LINEAR
OS_CC: 20/20
VA_OR_OD_CURRENT_RX: 20/20-1
OS_CC+: -3
VA_OR_OS_CURRENT_RX: 20/20-3
OD_CC: 20/20
CORRECTION_TYPE: CONTACTS

## 2025-02-24 ASSESSMENT — CUP TO DISC RATIO
OD_RATIO: 0.4
OS_RATIO: 0.35

## 2025-02-24 ASSESSMENT — EXTERNAL EXAM - RIGHT EYE: OD_EXAM: NORMAL

## 2025-02-24 NOTE — PROGRESS NOTES
Assessment/Plan   Diagnoses and all orders for this visit:  Nuclear sclerotic cataract of both eyes  Monitoring for cataract, hx of chemical injury w/ nail glue. No cataract on exam. Ocular health WNL OU.   Pt requests exam billed medically, aware we are not in network w/ [facility].     Hypermetropia of both eyes  Regular astigmatism of both eyes  Presbyopia  New spec rx released today per patient request. Ocular health wnl for age OU. Monitor 1 year or sooner prn. Refraction billed today. Pt consents to receiving glasses Rx today. Patient's/guardian's signature obtained to acknowledge and confirm that a paper copy of glasses Rx was given to patient in compliance with On license of [facility] Eyeglass Rule. Electronic copy of Rx will also be available via [employer].   Discussed proper wear, care, replacement of contact lenses. Gave handout. D/c cl wear and RTC if eyes become red, painful, irritated. Monitor 1 year.   CL eval billed today. $35  Option to switch to alternate DD CL if/when DACP is dc'd.   Trial of biotrue given.

## 2025-02-28 ENCOUNTER — TELEPHONE (OUTPATIENT)
Dept: OPHTHALMOLOGY | Facility: CLINIC | Age: 55
End: 2025-02-28
Payer: COMMERCIAL

## 2025-03-03 ENCOUNTER — TELEPHONE (OUTPATIENT)
Dept: OPHTHALMOLOGY | Facility: CLINIC | Age: 55
End: 2025-03-03
Payer: COMMERCIAL

## 2025-03-03 ENCOUNTER — TELEPHONE (OUTPATIENT)
Dept: OPHTHALMOLOGY | Facility: CLINIC | Age: 55
End: 2025-03-03

## 2025-03-12 DIAGNOSIS — K51.818 OTHER ULCERATIVE COLITIS WITH OTHER COMPLICATION: ICD-10-CM

## 2025-03-12 RX ORDER — PREDNISONE 1 MG/1
1 TABLET ORAL DAILY
Qty: 150 TABLET | Refills: 3 | Status: SHIPPED | OUTPATIENT
Start: 2025-03-12 | End: 2025-03-14 | Stop reason: SDUPTHER

## 2025-03-14 RX ORDER — PREDNISONE 1 MG/1
5 TABLET ORAL DAILY
Qty: 30 TABLET | Refills: 2 | Status: SHIPPED | OUTPATIENT
Start: 2025-03-14

## 2025-03-26 DIAGNOSIS — B00.9 HERPES: Primary | ICD-10-CM

## 2025-03-26 RX ORDER — VALACYCLOVIR HYDROCHLORIDE 500 MG/1
500 TABLET, FILM COATED ORAL 2 TIMES DAILY
Qty: 60 TABLET | Refills: 2 | Status: SHIPPED | OUTPATIENT
Start: 2025-03-26 | End: 2025-03-29

## 2025-04-03 DIAGNOSIS — K51.818 OTHER ULCERATIVE COLITIS WITH OTHER COMPLICATION: ICD-10-CM

## 2025-04-03 RX ORDER — PREDNISONE 1 MG/1
5 TABLET ORAL DAILY
Qty: 150 TABLET | Refills: 2 | Status: SHIPPED | OUTPATIENT
Start: 2025-04-03

## 2025-05-20 DIAGNOSIS — K51.818 OTHER ULCERATIVE COLITIS WITH OTHER COMPLICATION: Primary | ICD-10-CM

## 2025-05-21 ASSESSMENT — DERMATOLOGY QUALITY OF LIFE (QOL) ASSESSMENT
RATE HOW BOTHERED YOU ARE BY EFFECTS OF YOUR SKIN PROBLEMS ON YOUR ACTIVITIES (EG, GOING OUT, ACCOMPLISHING WHAT YOU WANT, WORK ACTIVITIES OR YOUR RELATIONSHIPS WITH OTHERS): 0 - NEVER BOTHERED
RATE HOW BOTHERED YOU ARE BY SYMPTOMS OF YOUR SKIN PROBLEM (EG, ITCHING, STINGING BURNING, HURTING OR SKIN IRRITATION): 0 - NEVER BOTHERED
WHAT SINGLE SKIN CONDITION LISTED BELOW IS THE PATIENT ANSWERING THE QUALITY-OF-LIFE ASSESSMENT QUESTIONS ABOUT: NONE OF THE ABOVE
RATE HOW BOTHERED YOU ARE BY EFFECTS OF YOUR SKIN PROBLEMS ON YOUR ACTIVITIES (EG, GOING OUT, ACCOMPLISHING WHAT YOU WANT, WORK ACTIVITIES OR YOUR RELATIONSHIPS WITH OTHERS): 0 - NEVER BOTHERED
RATE HOW EMOTIONALLY BOTHERED YOU ARE BY YOUR SKIN PROBLEM (FOR EXAMPLE, WORRY, EMBARRASSMENT, FRUSTRATION): 0 - NEVER BOTHERED
RATE HOW BOTHERED YOU ARE BY SYMPTOMS OF YOUR SKIN PROBLEM (EG, ITCHING, STINGING BURNING, HURTING OR SKIN IRRITATION): 0 - NEVER BOTHERED
RATE HOW EMOTIONALLY BOTHERED YOU ARE BY YOUR SKIN PROBLEM (FOR EXAMPLE, WORRY, EMBARRASSMENT, FRUSTRATION): 0 - NEVER BOTHERED
WHAT SINGLE SKIN CONDITION LISTED BELOW IS THE PATIENT ANSWERING THE QUALITY-OF-LIFE ASSESSMENT QUESTIONS ABOUT: NONE OF THE ABOVE

## 2025-05-22 RX ORDER — SODIUM, POTASSIUM,MAG SULFATES 17.5-3.13G
SOLUTION, RECONSTITUTED, ORAL ORAL
Qty: 354 ML | Refills: 0 | Status: SHIPPED | OUTPATIENT
Start: 2025-05-22

## 2025-05-28 ENCOUNTER — APPOINTMENT (OUTPATIENT)
Dept: DERMATOLOGY | Facility: CLINIC | Age: 55
End: 2025-05-28
Payer: COMMERCIAL

## 2025-05-28 DIAGNOSIS — L81.4 LENTIGO: ICD-10-CM

## 2025-05-28 DIAGNOSIS — Z12.83 ENCOUNTER FOR SCREENING FOR MALIGNANT NEOPLASM OF SKIN: ICD-10-CM

## 2025-05-28 DIAGNOSIS — L82.1 SEBORRHEIC KERATOSIS: ICD-10-CM

## 2025-05-28 DIAGNOSIS — Z85.828 PERSONAL HISTORY OF SKIN CANCER: ICD-10-CM

## 2025-05-28 DIAGNOSIS — L71.9 ROSACEA: ICD-10-CM

## 2025-05-28 DIAGNOSIS — D22.9 MULTIPLE BENIGN NEVI: Primary | ICD-10-CM

## 2025-05-28 PROCEDURE — 99214 OFFICE O/P EST MOD 30 MIN: CPT | Performed by: DERMATOLOGY

## 2025-05-28 ASSESSMENT — DERMATOLOGY QUALITY OF LIFE (QOL) ASSESSMENT
DATE THE QUALITY-OF-LIFE ASSESSMENT WAS COMPLETED: 67353
ARE THERE EXCLUSIONS OR EXCEPTIONS FOR THE QUALITY OF LIFE ASSESSMENT: NO
RATE HOW BOTHERED YOU ARE BY SYMPTOMS OF YOUR SKIN PROBLEM (EG, ITCHING, STINGING BURNING, HURTING OR SKIN IRRITATION): 0 - NEVER BOTHERED
RATE HOW BOTHERED YOU ARE BY EFFECTS OF YOUR SKIN PROBLEMS ON YOUR ACTIVITIES (EG, GOING OUT, ACCOMPLISHING WHAT YOU WANT, WORK ACTIVITIES OR YOUR RELATIONSHIPS WITH OTHERS): 0 - NEVER BOTHERED
WHAT SINGLE SKIN CONDITION LISTED BELOW IS THE PATIENT ANSWERING THE QUALITY-OF-LIFE ASSESSMENT QUESTIONS ABOUT: NONE OF THE ABOVE
RATE HOW EMOTIONALLY BOTHERED YOU ARE BY YOUR SKIN PROBLEM (FOR EXAMPLE, WORRY, EMBARRASSMENT, FRUSTRATION): 0 - NEVER BOTHERED

## 2025-05-28 ASSESSMENT — DERMATOLOGY PATIENT ASSESSMENT
DO YOU USE SUNSCREEN: OCCASIONALLY
HAVE YOU HAD OR DO YOU HAVE VASCULAR DISEASE: NO
DO YOU HAVE ANY NEW OR CHANGING LESIONS: NO
DO YOU HAVE IRREGULAR MENSTRUAL CYCLES: NO
ARE YOU AN ORGAN TRANSPLANT RECIPIENT: NO
ARE YOU TRYING TO GET PREGNANT: NO
ARE YOU ON BIRTH CONTROL: NO
HAVE YOU HAD OR DO YOU HAVE A STAPH INFECTION: NO
DO YOU USE A TANNING BED: NO

## 2025-05-28 ASSESSMENT — PATIENT GLOBAL ASSESSMENT (PGA): PATIENT GLOBAL ASSESSMENT: PATIENT GLOBAL ASSESSMENT:  1 - CLEAR

## 2025-05-28 ASSESSMENT — ITCH NUMERIC RATING SCALE: HOW SEVERE IS YOUR ITCHING?: 0

## 2025-05-28 NOTE — Clinical Note
Personal History of Non-Melanoma Skin Cancer  -Well healed scar with no evidence of recurrence  -Discussed the need for annual or semi-annual skin examinations and to return sooner if any new or changing lesions are noticed. Patient verbalizes understanding  -BCC of the left forehead in 2008 is most recent skin cancer. Discussed that she may do yearly skin checks, however she prefers [id number] skin checks due to family history of melanoma and personal history of sun exposure.

## 2025-05-28 NOTE — PROGRESS NOTES
Subjective     Breanne Orta is a 54 y.o. female who presents for the following: Skin Check (FBSE, no spots of concern).     6mo follow up per pt preference d/t fam hx of melanoma. Her last skin ca was BCC in 2008.    The face rash she had dealt with at several of the last visits resolved and has been clear for 2mo since she stopped hydrocortisone and desonide cream cold turkey.    Intake Questions  Do you have any new or changing Lesions?: No  Are you an organ transplant recipient?: No  Have you had or do you have a Staph Infection?: No  Have you had or do you have Vacular Disease?: No  Do you use sunscreen?: Occasionally  Do you use a tanning bed?: No  Are you trying to get pregnant?: No  Are you on birth control?: No  Do you have irregular menstrual cycles?: No    Review of Systems:  No other skin or systemic complaints other than what is documented elsewhere in the note.    The following portions of the chart were reviewed this encounter and updated as appropriate:         Skin Cancer History  Biopsy Log Book  No skin cancers from Specimen Tracking.    Additional History      Specialty Problems          Dermatology Problems    Actinic keratosis    Dyshidrosis (pompholyx)    Hemangioma of skin and subcutaneous tissue    Lentigines    Melanocytic nevi of ear and external auricular canal    Melanocytic nevi of right lower limb, including hip    Melanocytic nevi of scalp and neck    Melanocytic nevi of trunk    Melanocytic nevi of unspecified part of face    Melanocytic nevi of unspecified upper limb, including shoulder    Neoplasm of uncertain behavior of skin    Onycholysis    Other melanin hyperpigmentation    Other seborrheic keratosis    Personal history of other malignant neoplasm of skin    Scar condition and fibrosis of skin    Skin changes due to chronic exposure to nonionizing radiation, unspecified        Objective   Well appearing patient in no apparent distress; mood and affect are within normal  limits.    A full examination was performed including scalp, head, eyes, ears, nose, lips, neck, chest, axillae, abdomen, back, buttocks, bilateral upper extremities, bilateral lower extremities, hands, feet, fingers, toes, fingernails, and toenails. All findings within normal limits unless otherwise noted below.    Assessment/Plan   Skin Exam  1. MULTIPLE BENIGN NEVI  Generalized  Brown and tan macules and papules with reassuring findings on dermoscopy  -These lesions have benign, reassuring patterns on dermoscopy  -Recommend continued self observation, and to contact the office if any changes in nevi are noticed  2. ENCOUNTER FOR SCREENING FOR MALIGNANT NEOPLASM OF SKIN      Related Procedures  Follow Up In Dermatology - Established Patient  3. LENTIGO  Generalized  Tan macules  -Benign appearing on exam  -Reassurance, recommend observation  4. SEBORRHEIC KERATOSIS  Generalized  Stuck on, waxy macule(s)/papule(s)/plaque(s) with comedo-like openings and milia like cysts  -Discussed the nature of the diagnosis  -Reassurance, recommend continued observation  5. PERSONAL HISTORY OF SKIN CANCER  Head - Anterior (Face)  Scar from 2008 N[facility] without evidence of recurrence on L forehead  Personal History of Non-Melanoma Skin Cancer  -Well healed scar with no evidence of recurrence  -Discussed the need for annual or semi-annual skin examinations and to return sooner if any new or changing lesions are noticed. Patient verbalizes understanding  -BCC of the left forehead in 2008 is most recent skin cancer. Discussed that she may do yearly skin checks, however she prefers [id number] skin checks due to family history of melanoma and personal history of sun exposure.  Related Procedures  Follow Up In Dermatology - Established Patient  6. ROSACEA  Head - Anterior (Face)  Few telangiectasias on the nose with mild background erythema of the cheeks, nose, and chin  - pt had what was previously thought to be allergic contact dermatitis  for many months  - she did not resolve with minocycline, hydrocortisone for many months topically, nor desonide cream  - thus may have been a component of steroid rosacea as well  - in the future, counseled that if pt rash returns or she desires we can try metronidazole cream as her skin runs on the dry side    F/u in 6mo for FBSE per pt preference  Pt seen by Dr. Steffanie Quintanilla, PGY-2  I saw and evaluated the patient. I personally obtained the key and critical portions of the history and physical exam or was physically present for key and critical portions performed by the resident/fellow. I reviewed the resident/fellow's documentation and discussed the patient with the resident/fellow. I agree with the resident/fellow's medical decision making as documented in the note and made changes where appropriate.

## 2025-05-28 NOTE — Clinical Note
- pt had what was previously thought to be allergic contact dermatitis for many months  - she did not resolve with minocycline, hydrocortisone for many months topically, nor desonide cream  - thus may have been a component of steroid rosacea as well  - in the future, counseled that if pt rash returns or she desires we can try metronidazole cream as her skin runs on the dry side

## 2025-05-30 ENCOUNTER — DOCUMENTATION (OUTPATIENT)
Dept: GASTROENTEROLOGY | Facility: CLINIC | Age: 55
End: 2025-05-30

## 2025-05-30 ENCOUNTER — HOSPITAL ENCOUNTER (OUTPATIENT)
Dept: GASTROENTEROLOGY | Facility: HOSPITAL | Age: 55
Discharge: HOME | End: 2025-05-30
Payer: COMMERCIAL

## 2025-05-30 VITALS
SYSTOLIC BLOOD PRESSURE: 101 MMHG | HEART RATE: 72 BPM | WEIGHT: 130.07 LBS | BODY MASS INDEX: 19.71 KG/M2 | TEMPERATURE: 98.6 F | RESPIRATION RATE: 18 BRPM | DIASTOLIC BLOOD PRESSURE: 67 MMHG | OXYGEN SATURATION: 99 % | HEIGHT: 68 IN

## 2025-05-30 DIAGNOSIS — K51.818 OTHER ULCERATIVE COLITIS WITH OTHER COMPLICATION: ICD-10-CM

## 2025-05-30 PROCEDURE — 3700000012 HC SEDATION LEVEL 5+ TIME - INITIAL 15 MINUTES 5/> YEARS

## 2025-05-30 PROCEDURE — 7100000009 HC PHASE TWO TIME - INITIAL BASE CHARGE

## 2025-05-30 PROCEDURE — 45380 COLONOSCOPY AND BIOPSY: CPT | Performed by: INTERNAL MEDICINE

## 2025-05-30 PROCEDURE — 7100000010 HC PHASE TWO TIME - EACH INCREMENTAL 1 MINUTE

## 2025-05-30 PROCEDURE — 3700000013 HC SEDATION LEVEL 5+ TIME - EACH ADDITIONAL 15 MINUTES

## 2025-05-30 PROCEDURE — 2500000004 HC RX 250 GENERAL PHARMACY W/ HCPCS (ALT 636 FOR OP/ED): Mod: JZ | Performed by: INTERNAL MEDICINE

## 2025-05-30 RX ORDER — ONDANSETRON HYDROCHLORIDE 2 MG/ML
4 INJECTION, SOLUTION INTRAVENOUS ONCE AS NEEDED
Status: DISCONTINUED | OUTPATIENT
Start: 2025-05-30 | End: 2025-05-31 | Stop reason: HOSPADM

## 2025-05-30 RX ORDER — FENTANYL CITRATE 50 UG/ML
INJECTION, SOLUTION INTRAMUSCULAR; INTRAVENOUS AS NEEDED
Status: COMPLETED | OUTPATIENT
Start: 2025-05-30 | End: 2025-05-30

## 2025-05-30 RX ORDER — ONDANSETRON HYDROCHLORIDE 2 MG/ML
INJECTION, SOLUTION INTRAVENOUS AS NEEDED
Status: COMPLETED | OUTPATIENT
Start: 2025-05-30 | End: 2025-05-30

## 2025-05-30 RX ORDER — MIDAZOLAM HYDROCHLORIDE 1 MG/ML
INJECTION INTRAMUSCULAR; INTRAVENOUS AS NEEDED
Status: COMPLETED | OUTPATIENT
Start: 2025-05-30 | End: 2025-05-30

## 2025-05-30 RX ADMIN — FENTANYL CITRATE 50 MCG: 50 INJECTION, SOLUTION INTRAMUSCULAR; INTRAVENOUS at 07:35

## 2025-05-30 RX ADMIN — FENTANYL CITRATE 50 MCG: 50 INJECTION, SOLUTION INTRAMUSCULAR; INTRAVENOUS at 07:38

## 2025-05-30 RX ADMIN — MIDAZOLAM HYDROCHLORIDE 1 MG: 1 INJECTION, SOLUTION INTRAMUSCULAR; INTRAVENOUS at 07:40

## 2025-05-30 RX ADMIN — MIDAZOLAM HYDROCHLORIDE 2 MG: 1 INJECTION, SOLUTION INTRAMUSCULAR; INTRAVENOUS at 07:35

## 2025-05-30 RX ADMIN — MIDAZOLAM HYDROCHLORIDE 2 MG: 1 INJECTION, SOLUTION INTRAMUSCULAR; INTRAVENOUS at 07:38

## 2025-05-30 RX ADMIN — ONDANSETRON 4 MG: 2 INJECTION INTRAMUSCULAR; INTRAVENOUS at 07:55

## 2025-05-30 ASSESSMENT — PAIN - FUNCTIONAL ASSESSMENT
PAIN_FUNCTIONAL_ASSESSMENT: 0-10

## 2025-05-30 ASSESSMENT — COLUMBIA-SUICIDE SEVERITY RATING SCALE - C-SSRS
2. HAVE YOU ACTUALLY HAD ANY THOUGHTS OF KILLING YOURSELF?: NO
6. HAVE YOU EVER DONE ANYTHING, STARTED TO DO ANYTHING, OR PREPARED TO DO ANYTHING TO END YOUR LIFE?: NO
1. IN THE PAST MONTH, HAVE YOU WISHED YOU WERE DEAD OR WISHED YOU COULD GO TO SLEEP AND NOT WAKE UP?: NO

## 2025-05-30 ASSESSMENT — PAIN SCALES - GENERAL
PAINLEVEL_OUTOF10: 0 - NO PAIN

## 2025-05-30 NOTE — H&P
"History Of Present Illness  Breanne Orta is a 54 y.o. female presenting with ulcerative colitis for surveillance colonoscopy.  Last exam 1/2023..     Past Medical History  Medical History[1]  Surgical History  Surgical History[2]  Social History  She reports that she has never smoked. She has never used smokeless tobacco. She reports current alcohol use of about 2.0 standard drinks of alcohol per week. She reports that she does not use drugs.    Family History  Family History[3]     Allergies  Allergies[4]  Review of Systems  Pre-sedation Evaluation:  ASA Classification - ASA 2 - Patient with mild systemic disease with no functional limitations  Mallampati Score - II (hard and soft palate, upper portion of tonsils and uvula visible)    Physical Exam   Vital signs reviewed  Patient alert and oriented in no acute distress  Cardiac exam regular rate and rhythm S1-S2 without murmurs gallops or rubs  Lungs clear to auscultation bilaterally  Abdomen soft and nontender without organomegaly or mass.     Last Recorded Vitals  Blood pressure 111/77, pulse 82, temperature 36.1 °C (97 °F), temperature source Temporal, resp. rate 14, height 1.727 m (5' 8\"), weight 59 kg (130 lb 1.1 oz), last menstrual period 05/01/2022, SpO2 97%.     Assessment/Plan   Colonoscopy for UC surveillance     PTA/Current Medications:  Prescriptions Prior to Admission[5]  Current Medications[6]  Georges Peterson MD       [1]   Past Medical History:  Diagnosis Date    Other conjunctivitis 07/13/2017    Giant papillary conjunctivitis    Ulcerative colitis    [2]   Past Surgical History:  Procedure Laterality Date    BLEPHAROPLASTY Bilateral 01/2024    Dr. Wetzel at [facility]    BREAST BIOPSY  1998, 2008?    OTHER SURGICAL HISTORY  10/01/2019    Oophorectomy   [3]   Family History  Problem Relation Name Age of Onset    Glaucoma Mother      Breast cancer Sister Adriana    [4] No Known Allergies  [5] (Not in a hospital admission)  [6]   Current Outpatient " Medications   Medication Sig Dispense Refill    budesonide-formoteroL (Symbicort) 160-4.5 mcg/actuation inhaler Inhale 2 puffs 2 times a day. as instructed      ergocalciferol (Vitamin D-2) 1.25 MG (10489 UT) capsule Take 1 capsule (1,250 mcg) by mouth every 14 (fourteen) days. 2 capsule 11    escitalopram (Lexapro) 10 mg tablet Take 1 tablet (10 mg) by mouth once daily.      predniSONE (Deltasone) 1 mg tablet Take 5 tablets (5 mg) by mouth once daily. 150 tablet 2    risedronate (Actonel) 35 mg tablet Take 1 tablet (35 mg) by mouth every 7 days. 12 tablet 1    tofacitinib (Xeljanz XR) 22 mg tablet extended release 24 hr TAKE 1 TABLET DAILY 90 tablet 3    zolpidem (Ambien) 10 mg tablet TAKE 1 TABLET BY MOUTH AT BEDTIME AS NEEDED FOR UP  DAYS.      estradiol (Estrace) 0.01 % (0.1 mg/gram) vaginal cream Insert 0.125 Applicatorfuls (0.5 g) into the vagina 3 times a week. Initially, use a pea-sized amount of the vaginal opening every night for 2 weeks, then 3 times weekly thereafter. (Patient not taking: Reported on 5/30/2025) 42.5 g 3    hydrocortisone 2.5 % cream Apply topically once daily for 14 days. Apply at night to affected areas 30 g 3    hydrocortisone-iodoquinoL (Dermazene) 1-1 % cream cream apply to affected area 3 times a day (Patient not taking: Reported on 5/30/2025)      ketoconazole (NIZOral) 2 % cream Apply topically once daily. Apply in morning to the affected areas (Patient not taking: Reported on 5/30/2025) 60 g 11    sodium,potassium,mag sulfates (Suprep) 17.5-3.13-1.6 gram solution Take one bottle twice as directed by the prep instructions (Patient not taking: Reported on 5/30/2025) 354 mL 0    tapinarof 1 % cream Apply 1 Application topically once daily. 60 g 3     No current facility-administered medications for this encounter.

## 2025-05-30 NOTE — PROGRESS NOTES
Patient overall feeling okay.  Continues on 2 mg of prednisone.  She continues to have urgency and occasional incontinence at least once every 10 days.  However, she was able to recently travel to [facility] and did okay.    Colonoscopy today shows persistent Medina 3 disease with ulceration in the rectum.  Otherwise, there is generally Medina 1 disease although there may be a patch of mild Medina 2 disease in the ascending colon where there is some tiny erosions.  Surveillance biopsies obtained.  No worrisome lesions seen endoscopically.    Will await biopsies, however, she continues with significant mucosal disease in the rectum with really mild disease elsewhere throughout the colon.  She tolerates her disease burden adequately, but it would be nice to try to improve some of her persistently troubling symptoms, especially incontinence which is brought on by urgency.    If the biopsies do not show any dysplasia, then we will plan to give a trial of upadacitinib in place of tofacitinib.  Patient will need to consider this.

## 2025-06-16 LAB
LABORATORY COMMENT REPORT: NORMAL
PATH REPORT.COMMENTS IMP SPEC: NORMAL
PATH REPORT.FINAL DX SPEC: NORMAL
PATH REPORT.GROSS SPEC: NORMAL
PATH REPORT.TOTAL CANCER: NORMAL

## 2025-06-18 DIAGNOSIS — K51.90 ULCERATIVE COLITIS, UNSPECIFIED, WITHOUT COMPLICATIONS (MULTI): ICD-10-CM

## 2025-06-18 RX ORDER — RISEDRONATE SODIUM 35 MG/1
35 TABLET, FILM COATED ORAL
Qty: 4 TABLET | Refills: 5 | Status: SHIPPED | OUTPATIENT
Start: 2025-06-18

## 2025-06-20 DIAGNOSIS — K51.818 OTHER ULCERATIVE COLITIS WITH OTHER COMPLICATION: ICD-10-CM

## 2025-06-20 RX ORDER — PREDNISONE 1 MG/1
5 TABLET ORAL DAILY
Qty: 150 TABLET | Refills: 2 | Status: SHIPPED | OUTPATIENT
Start: 2025-06-20

## 2025-07-10 DIAGNOSIS — K51.818 OTHER ULCERATIVE COLITIS WITH OTHER COMPLICATION: Primary | ICD-10-CM

## 2025-07-18 ENCOUNTER — APPOINTMENT (OUTPATIENT)
Dept: ENDOCRINOLOGY | Facility: CLINIC | Age: 55
End: 2025-07-18
Payer: COMMERCIAL

## 2025-08-25 DIAGNOSIS — K51.818 OTHER ULCERATIVE COLITIS WITH OTHER COMPLICATION: ICD-10-CM

## 2025-08-25 RX ORDER — UPADACITINIB 45 MG/1
45 TABLET, EXTENDED RELEASE ORAL DAILY
Qty: 28 TABLET | Refills: 0 | Status: SHIPPED | OUTPATIENT
Start: 2025-08-25

## 2025-09-12 ENCOUNTER — APPOINTMENT (OUTPATIENT)
Dept: ENDOCRINOLOGY | Facility: CLINIC | Age: 55
End: 2025-09-12
Payer: COMMERCIAL

## 2025-10-31 ENCOUNTER — APPOINTMENT (OUTPATIENT)
Dept: OBSTETRICS AND GYNECOLOGY | Facility: CLINIC | Age: 55
End: 2025-10-31
Payer: COMMERCIAL

## 2025-12-16 ENCOUNTER — APPOINTMENT (OUTPATIENT)
Dept: DERMATOLOGY | Facility: CLINIC | Age: 55
End: 2025-12-16
Payer: COMMERCIAL